# Patient Record
Sex: FEMALE | Employment: UNEMPLOYED | URBAN - METROPOLITAN AREA
[De-identification: names, ages, dates, MRNs, and addresses within clinical notes are randomized per-mention and may not be internally consistent; named-entity substitution may affect disease eponyms.]

---

## 2017-04-05 ENCOUNTER — DOCUMENTATION ONLY (OUTPATIENT)
Dept: PAIN MANAGEMENT | Age: 47
End: 2017-04-05

## 2017-04-05 NOTE — PROGRESS NOTES
Request for records from [address] [place] [address] and [facility] and fax request to [employer] to be process on 04/05/2017.

## 2018-06-07 ENCOUNTER — APPOINTMENT (OUTPATIENT)
Dept: GENERAL RADIOLOGY | Age: 48
DRG: 917 | End: 2018-06-07
Attending: EMERGENCY MEDICINE
Payer: SELF-PAY

## 2018-06-07 ENCOUNTER — APPOINTMENT (OUTPATIENT)
Dept: CT IMAGING | Age: 48
DRG: 917 | End: 2018-06-07
Attending: EMERGENCY MEDICINE
Payer: SELF-PAY

## 2018-06-07 ENCOUNTER — HOSPITAL ENCOUNTER (INPATIENT)
Age: 48
LOS: 5 days | Discharge: PSYCHIATRIC OR SUBSTANCE USE FACILITY | DRG: 917 | End: 2018-06-12
Attending: EMERGENCY MEDICINE | Admitting: INTERNAL MEDICINE
Payer: SELF-PAY

## 2018-06-07 DIAGNOSIS — R41.82 ALTERED MENTAL STATUS, UNSPECIFIED ALTERED MENTAL STATUS TYPE: ICD-10-CM

## 2018-06-07 DIAGNOSIS — T50.904A DRUG OVERDOSE, UNDETERMINED INTENT, INITIAL ENCOUNTER: Primary | ICD-10-CM

## 2018-06-07 PROBLEM — T50.901A OVERDOSE: Status: ACTIVE | Noted: 2018-06-07

## 2018-06-07 LAB
ALBUMIN SERPL-MCNC: 3.6 G/DL (ref 3.4–5)
ALBUMIN/GLOB SERPL: 0.9 {RATIO} (ref 0.8–1.7)
ALP SERPL-CCNC: 71 U/L (ref 45–117)
ALT SERPL-CCNC: 43 U/L (ref 13–56)
AMPHET UR QL SCN: NEGATIVE
ANION GAP SERPL CALC-SCNC: 12 MMOL/L (ref 3–18)
APAP SERPL-MCNC: <2 UG/ML (ref 10–30)
AST SERPL-CCNC: 121 U/L (ref 15–37)
BARBITURATES UR QL SCN: NEGATIVE
BASOPHILS # BLD: 0.1 K/UL (ref 0–0.06)
BASOPHILS NFR BLD: 0 % (ref 0–2)
BENZODIAZ UR QL: NEGATIVE
BILIRUB SERPL-MCNC: 0.3 MG/DL (ref 0.2–1)
BUN SERPL-MCNC: 14 MG/DL (ref 7–18)
BUN/CREAT SERPL: 13 (ref 12–20)
CALCIUM SERPL-MCNC: 8.7 MG/DL (ref 8.5–10.1)
CANNABINOIDS UR QL SCN: NEGATIVE
CHLORIDE SERPL-SCNC: 104 MMOL/L (ref 100–108)
CK SERPL-CCNC: 6408 U/L (ref 26–192)
CO2 SERPL-SCNC: 23 MMOL/L (ref 21–32)
COCAINE UR QL SCN: NEGATIVE
CREAT SERPL-MCNC: 1.07 MG/DL (ref 0.6–1.3)
DIFFERENTIAL METHOD BLD: ABNORMAL
EOSINOPHIL # BLD: 0 K/UL (ref 0–0.4)
EOSINOPHIL NFR BLD: 0 % (ref 0–5)
ERYTHROCYTE [DISTWIDTH] IN BLOOD BY AUTOMATED COUNT: 12.8 % (ref 11.6–14.5)
ETHANOL SERPL-MCNC: <3 MG/DL (ref 0–3)
GLOBULIN SER CALC-MCNC: 3.8 G/DL (ref 2–4)
GLUCOSE SERPL-MCNC: 138 MG/DL (ref 74–99)
HCT VFR BLD AUTO: 41.9 % (ref 35–45)
HDSCOM,HDSCOM: ABNORMAL
HGB BLD-MCNC: 13.9 G/DL (ref 12–16)
LACTATE SERPL-SCNC: 2 MMOL/L (ref 0.4–2)
LYMPHOCYTES # BLD: 2 K/UL (ref 0.9–3.6)
LYMPHOCYTES NFR BLD: 11 % (ref 21–52)
MAGNESIUM SERPL-MCNC: 2 MG/DL (ref 1.6–2.6)
MCH RBC QN AUTO: 29.8 PG (ref 24–34)
MCHC RBC AUTO-ENTMCNC: 33.2 G/DL (ref 31–37)
MCV RBC AUTO: 89.7 FL (ref 74–97)
METHADONE UR QL: POSITIVE
MONOCYTES # BLD: 1.3 K/UL (ref 0.05–1.2)
MONOCYTES NFR BLD: 7 % (ref 3–10)
NEUTS SEG # BLD: 15.1 K/UL (ref 1.8–8)
NEUTS SEG NFR BLD: 82 % (ref 40–73)
OPIATES UR QL: NEGATIVE
PCP UR QL: NEGATIVE
PLATELET # BLD AUTO: 234 K/UL (ref 135–420)
PMV BLD AUTO: 10.5 FL (ref 9.2–11.8)
POTASSIUM SERPL-SCNC: 5.3 MMOL/L (ref 3.5–5.5)
PROT SERPL-MCNC: 7.4 G/DL (ref 6.4–8.2)
RBC # BLD AUTO: 4.67 M/UL (ref 4.2–5.3)
SALICYLATES SERPL-MCNC: 4.8 MG/DL (ref 2.8–20)
SODIUM SERPL-SCNC: 139 MMOL/L (ref 136–145)
WBC # BLD AUTO: 18.5 K/UL (ref 4.6–13.2)

## 2018-06-07 PROCEDURE — 83605 ASSAY OF LACTIC ACID: CPT | Performed by: EMERGENCY MEDICINE

## 2018-06-07 PROCEDURE — 82550 ASSAY OF CK (CPK): CPT | Performed by: EMERGENCY MEDICINE

## 2018-06-07 PROCEDURE — 80053 COMPREHEN METABOLIC PANEL: CPT | Performed by: EMERGENCY MEDICINE

## 2018-06-07 PROCEDURE — 99285 EMERGENCY DEPT VISIT HI MDM: CPT

## 2018-06-07 PROCEDURE — 71045 X-RAY EXAM CHEST 1 VIEW: CPT

## 2018-06-07 PROCEDURE — 74011250636 HC RX REV CODE- 250/636: Performed by: EMERGENCY MEDICINE

## 2018-06-07 PROCEDURE — 83735 ASSAY OF MAGNESIUM: CPT | Performed by: INTERNAL MEDICINE

## 2018-06-07 PROCEDURE — 93005 ELECTROCARDIOGRAM TRACING: CPT

## 2018-06-07 PROCEDURE — 80307 DRUG TEST PRSMV CHEM ANLYZR: CPT | Performed by: EMERGENCY MEDICINE

## 2018-06-07 PROCEDURE — 96360 HYDRATION IV INFUSION INIT: CPT

## 2018-06-07 PROCEDURE — 70450 CT HEAD/BRAIN W/O DYE: CPT

## 2018-06-07 PROCEDURE — 87086 URINE CULTURE/COLONY COUNT: CPT | Performed by: INTERNAL MEDICINE

## 2018-06-07 PROCEDURE — 83735 ASSAY OF MAGNESIUM: CPT | Performed by: EMERGENCY MEDICINE

## 2018-06-07 PROCEDURE — 84100 ASSAY OF PHOSPHORUS: CPT | Performed by: INTERNAL MEDICINE

## 2018-06-07 PROCEDURE — 85025 COMPLETE CBC W/AUTO DIFF WBC: CPT | Performed by: EMERGENCY MEDICINE

## 2018-06-07 PROCEDURE — 81001 URINALYSIS AUTO W/SCOPE: CPT | Performed by: INTERNAL MEDICINE

## 2018-06-07 RX ORDER — ONDANSETRON 2 MG/ML
4 INJECTION INTRAMUSCULAR; INTRAVENOUS
Status: DISCONTINUED | OUTPATIENT
Start: 2018-06-07 | End: 2018-06-12 | Stop reason: HOSPADM

## 2018-06-07 RX ORDER — ENOXAPARIN SODIUM 100 MG/ML
40 INJECTION SUBCUTANEOUS EVERY 24 HOURS
Status: DISCONTINUED | OUTPATIENT
Start: 2018-06-07 | End: 2018-06-08

## 2018-06-07 RX ORDER — SODIUM CHLORIDE 9 MG/ML
200 INJECTION, SOLUTION INTRAVENOUS CONTINUOUS
Status: DISPENSED | OUTPATIENT
Start: 2018-06-07 | End: 2018-06-08

## 2018-06-07 RX ADMIN — SODIUM CHLORIDE 1000 ML: 900 INJECTION, SOLUTION INTRAVENOUS at 21:06

## 2018-06-08 ENCOUNTER — APPOINTMENT (OUTPATIENT)
Dept: ULTRASOUND IMAGING | Age: 48
DRG: 917 | End: 2018-06-08
Attending: INTERNAL MEDICINE
Payer: SELF-PAY

## 2018-06-08 LAB
ALBUMIN SERPL-MCNC: 2.4 G/DL (ref 3.4–5)
ALBUMIN SERPL-MCNC: 2.7 G/DL (ref 3.4–5)
ALBUMIN SERPL-MCNC: 3.2 G/DL (ref 3.4–5)
ALBUMIN/GLOB SERPL: 0.8 {RATIO} (ref 0.8–1.7)
ALBUMIN/GLOB SERPL: 1 {RATIO} (ref 0.8–1.7)
ALBUMIN/GLOB SERPL: 1.2 {RATIO} (ref 0.8–1.7)
ALP SERPL-CCNC: 52 U/L (ref 45–117)
ALP SERPL-CCNC: 55 U/L (ref 45–117)
ALP SERPL-CCNC: 64 U/L (ref 45–117)
ALT SERPL-CCNC: 107 U/L (ref 13–56)
ALT SERPL-CCNC: 128 U/L (ref 13–56)
ALT SERPL-CCNC: 131 U/L (ref 13–56)
AMMONIA PLAS-SCNC: 11 UMOL/L (ref 11–32)
ANION GAP SERPL CALC-SCNC: 11 MMOL/L (ref 3–18)
ANION GAP SERPL CALC-SCNC: 16 MMOL/L (ref 3–18)
APPEARANCE UR: CLEAR
APTT PPP: 113.2 SEC (ref 23–36.4)
APTT PPP: 174.6 SEC (ref 23–36.4)
APTT PPP: 34.9 SEC (ref 23–36.4)
ARTERIAL PATENCY WRIST A: YES
AST SERPL-CCNC: 456 U/L (ref 15–37)
AST SERPL-CCNC: 519 U/L (ref 15–37)
AST SERPL-CCNC: 535 U/L (ref 15–37)
BACTERIA URNS QL MICRO: ABNORMAL /HPF
BASE DEFICIT BLD-SCNC: 3 MMOL/L
BASOPHILS # BLD: 0.1 K/UL (ref 0–0.06)
BASOPHILS NFR BLD: 0 % (ref 0–2)
BDY SITE: ABNORMAL
BILIRUB DIRECT SERPL-MCNC: 0.1 MG/DL (ref 0–0.2)
BILIRUB DIRECT SERPL-MCNC: 0.1 MG/DL (ref 0–0.2)
BILIRUB SERPL-MCNC: 0.6 MG/DL (ref 0.2–1)
BILIRUB SERPL-MCNC: 0.6 MG/DL (ref 0.2–1)
BILIRUB SERPL-MCNC: 0.7 MG/DL (ref 0.2–1)
BILIRUB UR QL: NEGATIVE
BODY TEMPERATURE: 99
BUN SERPL-MCNC: 10 MG/DL (ref 7–18)
BUN SERPL-MCNC: 13 MG/DL (ref 7–18)
BUN/CREAT SERPL: 14 (ref 12–20)
BUN/CREAT SERPL: 15 (ref 12–20)
CA-I SERPL-SCNC: 1.08 MMOL/L (ref 1.12–1.32)
CALCIUM SERPL-MCNC: 7.4 MG/DL (ref 8.5–10.1)
CALCIUM SERPL-MCNC: 8.2 MG/DL (ref 8.5–10.1)
CHLORIDE SERPL-SCNC: 101 MMOL/L (ref 100–108)
CHLORIDE SERPL-SCNC: 107 MMOL/L (ref 100–108)
CK SERPL-CCNC: 6630 U/L (ref 26–192)
CK SERPL-CCNC: ABNORMAL U/L (ref 26–192)
CO2 SERPL-SCNC: 23 MMOL/L (ref 21–32)
CO2 SERPL-SCNC: 24 MMOL/L (ref 21–32)
COLOR UR: YELLOW
CREAT SERPL-MCNC: 0.72 MG/DL (ref 0.6–1.3)
CREAT SERPL-MCNC: 0.84 MG/DL (ref 0.6–1.3)
DIFFERENTIAL METHOD BLD: ABNORMAL
EOSINOPHIL # BLD: 0.1 K/UL (ref 0–0.4)
EOSINOPHIL NFR BLD: 0 % (ref 0–5)
EPITH CASTS URNS QL MICRO: ABNORMAL /LPF (ref 0–5)
ERYTHROCYTE [DISTWIDTH] IN BLOOD BY AUTOMATED COUNT: 12.8 % (ref 11.6–14.5)
GAS FLOW.O2 O2 DELIVERY SYS: ABNORMAL L/MIN
GAS FLOW.O2 SETTING OXYMISER: 2 L/M
GLOBULIN SER CALC-MCNC: 2.7 G/DL (ref 2–4)
GLOBULIN SER CALC-MCNC: 2.7 G/DL (ref 2–4)
GLOBULIN SER CALC-MCNC: 3.2 G/DL (ref 2–4)
GLUCOSE BLD STRIP.AUTO-MCNC: 125 MG/DL (ref 70–110)
GLUCOSE BLD STRIP.AUTO-MCNC: 129 MG/DL (ref 70–110)
GLUCOSE BLD STRIP.AUTO-MCNC: 153 MG/DL (ref 70–110)
GLUCOSE BLD STRIP.AUTO-MCNC: 156 MG/DL (ref 70–110)
GLUCOSE BLD STRIP.AUTO-MCNC: 157 MG/DL (ref 70–110)
GLUCOSE SERPL-MCNC: 114 MG/DL (ref 74–99)
GLUCOSE SERPL-MCNC: 147 MG/DL (ref 74–99)
GLUCOSE UR STRIP.AUTO-MCNC: NEGATIVE MG/DL
HCO3 BLD-SCNC: 23.2 MMOL/L (ref 22–26)
HCT VFR BLD AUTO: 41.4 % (ref 35–45)
HGB BLD-MCNC: 13.5 G/DL (ref 12–16)
HGB UR QL STRIP: ABNORMAL
HYALINE CASTS URNS QL MICRO: ABNORMAL /LPF (ref 0–2)
INR PPP: 1 (ref 0.8–1.2)
KETONES UR QL STRIP.AUTO: NEGATIVE MG/DL
LACTATE SERPL-SCNC: 1.1 MMOL/L (ref 0.4–2)
LACTATE SERPL-SCNC: 1.4 MMOL/L (ref 0.4–2)
LEUKOCYTE ESTERASE UR QL STRIP.AUTO: ABNORMAL
LYMPHOCYTES # BLD: 1.8 K/UL (ref 0.9–3.6)
LYMPHOCYTES NFR BLD: 14 % (ref 21–52)
MAGNESIUM SERPL-MCNC: 1.6 MG/DL (ref 1.6–2.6)
MAGNESIUM SERPL-MCNC: 1.9 MG/DL (ref 1.6–2.6)
MAGNESIUM SERPL-MCNC: 1.9 MG/DL (ref 1.6–2.6)
MCH RBC QN AUTO: 29.3 PG (ref 24–34)
MCHC RBC AUTO-ENTMCNC: 32.6 G/DL (ref 31–37)
MCV RBC AUTO: 90 FL (ref 74–97)
MONOCYTES # BLD: 1.1 K/UL (ref 0.05–1.2)
MONOCYTES NFR BLD: 9 % (ref 3–10)
MUCOUS THREADS URNS QL MICRO: ABNORMAL /LPF
NEUTS SEG # BLD: 10.1 K/UL (ref 1.8–8)
NEUTS SEG NFR BLD: 77 % (ref 40–73)
NITRITE UR QL STRIP.AUTO: NEGATIVE
PCO2 BLD: 44.5 MMHG (ref 35–45)
PH BLD: 7.33 [PH] (ref 7.35–7.45)
PH UR STRIP: 5 [PH] (ref 5–8)
PHOSPHATE SERPL-MCNC: 2.5 MG/DL (ref 2.5–4.9)
PHOSPHATE SERPL-MCNC: 6.9 MG/DL (ref 2.5–4.9)
PLATELET # BLD AUTO: 228 K/UL (ref 135–420)
PMV BLD AUTO: 10.4 FL (ref 9.2–11.8)
PO2 BLD: 91 MMHG (ref 80–100)
POTASSIUM SERPL-SCNC: 3.4 MMOL/L (ref 3.5–5.5)
POTASSIUM SERPL-SCNC: 4.4 MMOL/L (ref 3.5–5.5)
PROT SERPL-MCNC: 5.4 G/DL (ref 6.4–8.2)
PROT SERPL-MCNC: 5.6 G/DL (ref 6.4–8.2)
PROT SERPL-MCNC: 5.9 G/DL (ref 6.4–8.2)
PROT UR STRIP-MCNC: 100 MG/DL
PROTHROMBIN TIME: 12.3 SEC (ref 11.5–15.2)
RBC # BLD AUTO: 4.6 M/UL (ref 4.2–5.3)
RBC #/AREA URNS HPF: NEGATIVE /HPF (ref 0–5)
SAO2 % BLD: 96 % (ref 92–97)
SERVICE CMNT-IMP: ABNORMAL
SODIUM SERPL-SCNC: 140 MMOL/L (ref 136–145)
SODIUM SERPL-SCNC: 142 MMOL/L (ref 136–145)
SP GR UR REFRACTOMETRY: 1.02 (ref 1–1.03)
SPECIMEN TYPE: ABNORMAL
TOTAL RESP. RATE, ITRR: 17
TSH SERPL DL<=0.05 MIU/L-ACNC: 1.43 UIU/ML (ref 0.36–3.74)
UROBILINOGEN UR QL STRIP.AUTO: 1 EU/DL (ref 0.2–1)
WBC # BLD AUTO: 13.1 K/UL (ref 4.6–13.2)
WBC URNS QL MICRO: ABNORMAL /HPF (ref 0–5)

## 2018-06-08 PROCEDURE — 36415 COLL VENOUS BLD VENIPUNCTURE: CPT | Performed by: INTERNAL MEDICINE

## 2018-06-08 PROCEDURE — 83605 ASSAY OF LACTIC ACID: CPT | Performed by: INTERNAL MEDICINE

## 2018-06-08 PROCEDURE — 93925 LOWER EXTREMITY STUDY: CPT

## 2018-06-08 PROCEDURE — 82140 ASSAY OF AMMONIA: CPT | Performed by: INTERNAL MEDICINE

## 2018-06-08 PROCEDURE — 77010033678 HC OXYGEN DAILY

## 2018-06-08 PROCEDURE — 74011250636 HC RX REV CODE- 250/636: Performed by: INTERNAL MEDICINE

## 2018-06-08 PROCEDURE — 84100 ASSAY OF PHOSPHORUS: CPT | Performed by: INTERNAL MEDICINE

## 2018-06-08 PROCEDURE — 82962 GLUCOSE BLOOD TEST: CPT

## 2018-06-08 PROCEDURE — 82550 ASSAY OF CK (CPK): CPT | Performed by: INTERNAL MEDICINE

## 2018-06-08 PROCEDURE — 83735 ASSAY OF MAGNESIUM: CPT | Performed by: INTERNAL MEDICINE

## 2018-06-08 PROCEDURE — 85610 PROTHROMBIN TIME: CPT | Performed by: INTERNAL MEDICINE

## 2018-06-08 PROCEDURE — 74011250637 HC RX REV CODE- 250/637: Performed by: INTERNAL MEDICINE

## 2018-06-08 PROCEDURE — 74011000258 HC RX REV CODE- 258: Performed by: INTERNAL MEDICINE

## 2018-06-08 PROCEDURE — 85730 THROMBOPLASTIN TIME PARTIAL: CPT | Performed by: INTERNAL MEDICINE

## 2018-06-08 PROCEDURE — 76705 ECHO EXAM OF ABDOMEN: CPT

## 2018-06-08 PROCEDURE — 93005 ELECTROCARDIOGRAM TRACING: CPT

## 2018-06-08 PROCEDURE — 93970 EXTREMITY STUDY: CPT

## 2018-06-08 PROCEDURE — 65610000006 HC RM INTENSIVE CARE

## 2018-06-08 PROCEDURE — 80053 COMPREHEN METABOLIC PANEL: CPT | Performed by: INTERNAL MEDICINE

## 2018-06-08 PROCEDURE — 82803 BLOOD GASES ANY COMBINATION: CPT

## 2018-06-08 PROCEDURE — C9113 INJ PANTOPRAZOLE SODIUM, VIA: HCPCS | Performed by: INTERNAL MEDICINE

## 2018-06-08 PROCEDURE — 36600 WITHDRAWAL OF ARTERIAL BLOOD: CPT

## 2018-06-08 PROCEDURE — 84443 ASSAY THYROID STIM HORMONE: CPT | Performed by: INTERNAL MEDICINE

## 2018-06-08 PROCEDURE — 83874 ASSAY OF MYOGLOBIN: CPT | Performed by: INTERNAL MEDICINE

## 2018-06-08 PROCEDURE — 85025 COMPLETE CBC W/AUTO DIFF WBC: CPT | Performed by: INTERNAL MEDICINE

## 2018-06-08 PROCEDURE — 74011000250 HC RX REV CODE- 250: Performed by: INTERNAL MEDICINE

## 2018-06-08 PROCEDURE — 77030034850

## 2018-06-08 PROCEDURE — 80076 HEPATIC FUNCTION PANEL: CPT | Performed by: INTERNAL MEDICINE

## 2018-06-08 PROCEDURE — 82330 ASSAY OF CALCIUM: CPT | Performed by: INTERNAL MEDICINE

## 2018-06-08 PROCEDURE — 80048 BASIC METABOLIC PNL TOTAL CA: CPT | Performed by: INTERNAL MEDICINE

## 2018-06-08 PROCEDURE — 93923 UPR/LXTR ART STDY 3+ LVLS: CPT

## 2018-06-08 PROCEDURE — 87040 BLOOD CULTURE FOR BACTERIA: CPT | Performed by: INTERNAL MEDICINE

## 2018-06-08 RX ORDER — HEPARIN SODIUM 10000 [USP'U]/100ML
18-36 INJECTION, SOLUTION INTRAVENOUS
Status: DISCONTINUED | OUTPATIENT
Start: 2018-06-08 | End: 2018-06-11 | Stop reason: ALTCHOICE

## 2018-06-08 RX ORDER — OXYCODONE HYDROCHLORIDE 5 MG/1
5 TABLET ORAL
Status: DISCONTINUED | OUTPATIENT
Start: 2018-06-08 | End: 2018-06-12 | Stop reason: HOSPADM

## 2018-06-08 RX ORDER — VANCOMYCIN 1.75 GRAM/500 ML IN 0.9 % SODIUM CHLORIDE INTRAVENOUS
1750 ONCE
Status: COMPLETED | OUTPATIENT
Start: 2018-06-08 | End: 2018-06-08

## 2018-06-08 RX ORDER — VANCOMYCIN HYDROCHLORIDE
1250 EVERY 12 HOURS
Status: DISCONTINUED | OUTPATIENT
Start: 2018-06-08 | End: 2018-06-09

## 2018-06-08 RX ORDER — MAGNESIUM SULFATE HEPTAHYDRATE 40 MG/ML
2 INJECTION, SOLUTION INTRAVENOUS ONCE
Status: COMPLETED | OUTPATIENT
Start: 2018-06-08 | End: 2018-06-08

## 2018-06-08 RX ORDER — POTASSIUM CHLORIDE 7.45 MG/ML
10 INJECTION INTRAVENOUS
Status: COMPLETED | OUTPATIENT
Start: 2018-06-08 | End: 2018-06-09

## 2018-06-08 RX ORDER — NICOTINE 7MG/24HR
1 PATCH, TRANSDERMAL 24 HOURS TRANSDERMAL DAILY
Status: DISCONTINUED | OUTPATIENT
Start: 2018-06-09 | End: 2018-06-08

## 2018-06-08 RX ORDER — POTASSIUM CHLORIDE 7.45 MG/ML
10 INJECTION INTRAVENOUS
Status: DISPENSED | OUTPATIENT
Start: 2018-06-08 | End: 2018-06-08

## 2018-06-08 RX ORDER — NICOTINE 7MG/24HR
1 PATCH, TRANSDERMAL 24 HOURS TRANSDERMAL ONCE
Status: DISCONTINUED | OUTPATIENT
Start: 2018-06-08 | End: 2018-06-09

## 2018-06-08 RX ADMIN — POTASSIUM CHLORIDE 10 MEQ: 10 INJECTION, SOLUTION INTRAVENOUS at 23:30

## 2018-06-08 RX ADMIN — ACETYLCYSTEINE 13100 MG: 200 INJECTION, SOLUTION INTRAVENOUS at 06:40

## 2018-06-08 RX ADMIN — DOXYCYCLINE 100 MG: 100 INJECTION, POWDER, LYOPHILIZED, FOR SOLUTION INTRAVENOUS at 08:10

## 2018-06-08 RX ADMIN — POTASSIUM CHLORIDE 10 MEQ: 10 INJECTION, SOLUTION INTRAVENOUS at 14:19

## 2018-06-08 RX ADMIN — ACETYLCYSTEINE 4360 MG: 200 INJECTION, SOLUTION INTRAVENOUS at 08:23

## 2018-06-08 RX ADMIN — VANCOMYCIN HYDROCHLORIDE 1750 MG: 10 INJECTION, POWDER, LYOPHILIZED, FOR SOLUTION INTRAVENOUS at 09:46

## 2018-06-08 RX ADMIN — MAGNESIUM SULFATE HEPTAHYDRATE 2 G: 40 INJECTION, SOLUTION INTRAVENOUS at 12:49

## 2018-06-08 RX ADMIN — SODIUM CHLORIDE 200 ML/HR: 900 INJECTION, SOLUTION INTRAVENOUS at 21:12

## 2018-06-08 RX ADMIN — DOXYCYCLINE 100 MG: 100 INJECTION, POWDER, LYOPHILIZED, FOR SOLUTION INTRAVENOUS at 21:12

## 2018-06-08 RX ADMIN — ENOXAPARIN SODIUM 40 MG: 40 INJECTION SUBCUTANEOUS at 00:32

## 2018-06-08 RX ADMIN — ACETYLCYSTEINE 8740 MG: 200 INJECTION, SOLUTION INTRAVENOUS at 14:23

## 2018-06-08 RX ADMIN — PIPERACILLIN SODIUM,TAZOBACTAM SODIUM 3.38 G: 3; .375 INJECTION, POWDER, FOR SOLUTION INTRAVENOUS at 12:46

## 2018-06-08 RX ADMIN — OXYCODONE HYDROCHLORIDE 5 MG: 5 TABLET ORAL at 15:58

## 2018-06-08 RX ADMIN — POTASSIUM CHLORIDE 10 MEQ: 10 INJECTION, SOLUTION INTRAVENOUS at 17:38

## 2018-06-08 RX ADMIN — PANTOPRAZOLE SODIUM 40 MG: 40 INJECTION, POWDER, FOR SOLUTION INTRAVENOUS at 08:10

## 2018-06-08 RX ADMIN — HEPARIN SODIUM AND DEXTROSE 18 UNITS/KG/HR: 10000; 5 INJECTION INTRAVENOUS at 03:49

## 2018-06-08 RX ADMIN — PIPERACILLIN SODIUM,TAZOBACTAM SODIUM 3.38 G: 3; .375 INJECTION, POWDER, FOR SOLUTION INTRAVENOUS at 17:39

## 2018-06-08 RX ADMIN — CALCIUM GLUCONATE 2 G: 94 INJECTION, SOLUTION INTRAVENOUS at 22:34

## 2018-06-08 RX ADMIN — SODIUM CHLORIDE 150 ML/HR: 900 INJECTION, SOLUTION INTRAVENOUS at 00:32

## 2018-06-08 RX ADMIN — POTASSIUM CHLORIDE 10 MEQ: 10 INJECTION, SOLUTION INTRAVENOUS at 21:12

## 2018-06-08 RX ADMIN — PIPERACILLIN SODIUM,TAZOBACTAM SODIUM 3.38 G: 3; .375 INJECTION, POWDER, FOR SOLUTION INTRAVENOUS at 06:05

## 2018-06-08 RX ADMIN — POTASSIUM CHLORIDE 10 MEQ: 10 INJECTION, SOLUTION INTRAVENOUS at 15:59

## 2018-06-08 RX ADMIN — PIPERACILLIN SODIUM,TAZOBACTAM SODIUM 3.38 G: 3; .375 INJECTION, POWDER, FOR SOLUTION INTRAVENOUS at 00:33

## 2018-06-08 NOTE — ED NOTES
Pt awakens to voice. Pt able to move extremities on own. When asked what month it is, pt responds \"tuesday\". Pt denies current needs. VSS except for slight sinus tachycardia. Pt's RR even and unlabored.

## 2018-06-08 NOTE — PROGRESS NOTES
Pt seen and examined. Found down this AM at home 2/2 overdose. Found to have a DVT and a cold left foot on exam.  On exam this AM the patient is on a heparin drip with motor and sensory intact. Left foot cool compared to right. Compartments soft. Doppler biphasic PT signal.  Duplex reviewed with lower velocities on left than right but no obvious stenosis or occlusion with normal waveforms. Agree with heparin drip, serial exams and compartment checks. Will obtain CTA once acute rhabdo has resolved. Full consult to follow.

## 2018-06-08 NOTE — PROCEDURES
[facility]  *** FINAL REPORT ***    Name: Johanny Victor  MRN: [id number]    Inpatient  : 1970  HIS Order #: [id number]  [id number] [facility] Visit #: [id number]  Date: 2018    TYPE OF TEST: Peripheral Venous Testing    REASON FOR TEST  Limb swelling    Right Leg:-  Deep venous thrombosis:           Yes  Proximal extent of thrombus:      Peroneal  Superficial venous thrombosis:    Not examined  Deep venous insufficiency:        Not examined  Superficial venous insufficiency: Not examined    Left Leg:-  Deep venous thrombosis:           No  Superficial venous thrombosis:    Not examined  Deep venous insufficiency:        Not examined  Superficial venous insufficiency: Not examined      INTERPRETATION/FINDINGS  Duplex images were obtained using 2-D gray scale, color flow, and  spectral Doppler analysis. Right leg :  1. Deep vein(s) visualized include the common femoral, deep femoral,  proximal femoral, mid femoral, distal femoral, popliteal(above knee),  popliteal(fossa), popliteal(below knee), posterior tibial and peroneal   veins. 2. Acute nonocclusive deep venous thrombosis identified in the  peroneal vein. Left leg :  1. Deep vein(s) visualized include the common femoral, deep femoral,  proximal femoral, mid femoral, distal femoral, popliteal(above knee),  popliteal(fossa), popliteal(below knee), posterior tibial and peroneal   veins. 2. No evidence of deep venous thrombosis detected in the veins  visualized. ADDITIONAL COMMENTS  Verbal report given to nurse Leah Rolon at 2:44am.    I have personally reviewed the data relevant to the interpretation of  this  study. TECHNOLOGIST: Annamarie TRIVEDI, RVT  Signed: 2018 02:58 AM    PHYSICIAN: Calderon Escamilla MD  Signed: 2018 01:47 PM

## 2018-06-08 NOTE — PROGRESS NOTES
Patient seen by Dr. Talat Pryor in am  Case discussed in rounds  Monitor LFT and CK  IV fluids  Monitro mentation if worsening consider repeat ABG  Good UO  WIll get EEG  Will follow   Other quality metrics discussed      Horacio Cowan M.D  [address]  [place] [place] [address] [address]  Phone [phone]   Fax [phone]  Pulmonary Critical Care & Sleep Medicine
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
## 2018-06-08 NOTE — ED NOTES
Pt arrives via EMS for suspected overdose. Pt found by family on floor. Pt with black emesis around her mouth. Pt's RR even and unlabored. Pt sleepy but awakes to voice and stimuli. EMS found empty pills bottles of zyprexa. Pt able to somewhat state name. Unknown how many zyprexa were possibly taken.  Pt filled prescription on 5-08 for 60 pills to be taken twice daily
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
## 2018-06-08 NOTE — PROGRESS NOTES
Problem: Falls - Risk of  Goal: *Absence of Falls  Document Shnona Fall Risk and appropriate interventions in the flowsheet. Outcome: Progressing Towards Goal  Fall Risk Interventions:       Mentation Interventions: Adequate sleep, hydration, pain control, Door open when patient unattended, Evaluate medications/consider consulting pharmacy    Medication Interventions: Evaluate medications/consider consulting pharmacy, Patient to call before getting OOB, Teach patient to arise slowly    Elimination Interventions: Call light in reach, Toileting schedule/hourly rounds             Problem: Pressure Injury - Risk of  Goal: *Prevention of pressure injury  Document Byron Scale and appropriate interventions in the flowsheet.    Outcome: Progressing Towards Goal  Pressure Injury Interventions:  Sensory Interventions: Assess changes in LOC    Moisture Interventions: Absorbent underpads, Check for incontinence Q2 hours and as needed, Internal/External urinary devices    Activity Interventions: Assess need for specialty bed, PT/OT evaluation    Mobility Interventions: Assess need for specialty bed, PT/OT evaluation    Nutrition Interventions: Discuss nutritional consult with provider

## 2018-06-08 NOTE — PROGRESS NOTES
-0700 Bedside and Verbal shift change report given to Sterling Sellers RN (oncoming nurse) by Jenny Workman RN (offgoing nurse). Report included the following information SBAR, Kardex, MAR and Cardiac Rhythm Sinus Tach. 0800 - Initial assessment completed. Left leg is pale, pulse only heard by doppler. Doctor Mesha Guillermo at bedside, will place warming blanket to bring temperature up. 1030 - IDR complete, orders received. 1200 - Reassessment completed, changes noted as the following, left leg noted to be warm and pulses are now palpable. 1600- Reassessment completed, no changes to previous assessment. 1800 - Spoke with doctor Phu Owen, reported heart rate in the 130-140's, no new orders received, asked for nicotine patch, orders received. Verbal shift change report given to Cindy Kearns RN (oncoming nurse) by Renate Lee RN (offgoing nurse). Report included the following information SBAR, Kardex, MAR and Cardiac Rhythm Sinus tach.

## 2018-06-08 NOTE — ROUTINE PROCESS
TRANSFER - OUT REPORT:    Verbal report given to Sally Guerrero RN (name) on Al Doherty  being transferred to ICU room 6 (unit) for routine progression of care       Report consisted of patients Situation, Background, Assessment and   Recommendations(SBAR). Information from the following report(s) SBAR and ED Summary was reviewed with the receiving nurse. Lines:   Peripheral IV 06/07/18 Right Hand (Active)   Site Assessment Clean, dry, & intact 6/7/2018  9:03 PM   Dressing Status Clean, dry, & intact 6/7/2018  9:03 PM       Peripheral IV 06/07/18 Left Forearm (Active)   Site Assessment Clean, dry, & intact 6/7/2018  9:15 PM   Dressing Status Clean, dry, & intact 6/7/2018  9:15 PM        Opportunity for questions and clarification was provided.       Patient transported with:   Monitor  O2 @ 2 liters  Registered Nurse

## 2018-06-08 NOTE — PROGRESS NOTES
Pharmacy Dosing Services: Vancomycin    Consult for Vancomycin Dosing by Pharmacy by Dr. Susan Cruz provided for this 57y.o. year old female , for indication of Empiric, Asp PNA. Day of Therapy 01    Ht Readings from Last 1 Encounters:   05/04/12 152.4 cm (60\")        Wt Readings from Last 1 Encounters:   06/08/18 87.3 kg (192 lb 7.4 oz)        Previous Regimen NA   Last Level NA   Other Current Antibiotics Doxycycline 100 mg IV q12, Zosyn 3.375 gm IV q6   Significant Cultures Pending   Serum Creatinine Lab Results   Component Value Date/Time    Creatinine 0.84 06/08/2018 03:06 AM      Creatinine Clearance CrCl cannot be calculated (Unknown ideal weight.). BUN Lab Results   Component Value Date/Time    BUN 13 06/08/2018 03:06 AM      WBC Lab Results   Component Value Date/Time    WBC 13.1 06/08/2018 03:06 AM      H/H Lab Results   Component Value Date/Time    HGB 13.5 06/08/2018 03:06 AM      Platelets Lab Results   Component Value Date/Time    PLATELET 119 07/37/2160 03:06 AM      Temp 99.1 °F (37.3 °C)     Start Vancomycin therapy, with loading dose of 1750 mg IV x1 @ 09:00 [id number]  Follow with maintenance dose of Vancomycin 1250 mg IV q12, beginning @ 21:00 [id number] x7 days    Dose calculated to approximate a therapeutic trough of 15-20 mcg/mL. Pharmacy to follow daily and will make changes to dose and/or frequency based on clinical status. Alla Douglass, Pharm. D.   Clinical Pharmacist  [phone]

## 2018-06-08 NOTE — PROCEDURES
[facility]  *** FINAL REPORT ***    Name: Latonya Smith  MRN: [id number]    Inpatient  : 1970  HIS Order #: [id number]  [id number] [facility] Visit #: [id number]  Date: 2018    TYPE OF TEST: Peripheral Arterial Testing    REASON FOR TEST  Rest pain (both sides)    Right Leg  Doppler:    Normal  Ankle/Brachial:    Left Leg  Doppler:    Normal  Ankle/Brachial: 1.01    INTERPRETATION/FINDINGS  Physiologic testing was performed using continuous wave Doppler and  segmental pressures. 1. No evidence of significant peripheral arterial disease at rest in  the right leg. 2. No evidence of significant peripheral arterial disease at rest in  the left leg. 3. Multiphasic Doppler waveforms are present in the bilateral  posterior tibial and dorsalis pedis arteries. 4. The right ankle pressure is not obtained, due to the presence of  acute deep venous thrombosis in the right peroneal vein. 5. The left ankle/brachial index is 1.01.  6. The great toe pressures are 75 mmHg on the right and 76 mmHg on the   left. ADDITIONAL COMMENTS    I have personally reviewed the data relevant to the interpretation of  this  study. TECHNOLOGIST: Romeo Uribe  Signed: 2018 09:34 AM    PHYSICIAN: Jennifer Cárdenas.  Zara Doll MD  Signed: 2018 01:49 PM
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
## 2018-06-08 NOTE — ED TRIAGE NOTES
PT brought to ED by EMS c/c AMS r/t  possible overdose on unknown amount of 15mg Zyprexia. Per EMS patient found face down on floor by daughter, appears to have black tarry emesis in mouth and nose. Pt responds to verbal and painful stimuli, unable to recall how much medication taken. Prescription vial dated 5/8 contained 60 tabs, and ordered to take 2 bid.

## 2018-06-08 NOTE — PROCEDURES
[facility]  *** FINAL REPORT ***    Name: Deloris Mao  MRN: [id number]    Inpatient  : 1970  HIS Order #: [id number]  [id number] [facility] Visit #: [id number]  Date: 2018    TYPE OF TEST: Extremity Arterial Duplex    REASON FOR TEST  Pulseless                            Right                     Left  Artery               PSV   Finding             PSV   Finding  ------------------  -----  ---------------    -----  ---------------  External iliac:  Common femoral:     116.8                     116.8  Profunda femoris:    77.1                      95.4  Proximal SFA:       114.2                      95.4  Mid SFA:            111.6                      73.4  Distal SFA:          84.5                      69.5  Popliteal:           84.5                      66.2  Anterior tibial:     93.1                      32.9  Posterior tibial:   120.7                      37.2    Pressures               Right     Left               -----     -----     Brachial:           DP:           PT:            YANNICK:            Toe: INTERPRETATION/FINDINGS  Duplex images were obtained using 2-D gray scale, color flow, and  spectral Doppler analysis. No evidence of hemodynamically significant arterial stenosis in the  bilateral lower extermities. ADDITIONAL COMMENTS    I have personally reviewed the data relevant to the interpretation of  this  study. TECHNOLOGIST: Antonio TRIVEDI, RVT  Signed: 2018 03:04 AM    PHYSICIAN: Lexi Ospina.  Yamilka Bermeo MD  Signed: 2018 01:49 PM
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
## 2018-06-08 NOTE — ED NOTES
Patient gave this RN permission to speak with daughter, Lilli Orozco, about plan of care. Phone number [phone] to notify daughter of any changes.

## 2018-06-08 NOTE — PROGRESS NOTES
Reason for Admission:   Drug overdose                   RRAT Score:6                     Plan for utilizing home health:     TBD                     Likelihood of Readmission:  TBD                         Transition of Care Plan:  Chart reviewed,visited pt at bedside, at this time pt remains confused,sitter at bedside no family present,pt will not be discharged over weekend,cm will cont to review case and remain available.

## 2018-06-08 NOTE — PROGRESS NOTES
2341-Verbal shift change report given to Tremaine Ramírez RN (oncoming nurse) by Daniel Gustafson RN in ED (offgoing nurse). Report included the following information SBAR, Kardex, ED Summary, Intake/Output, MAR, Accordion and Alarm Parameters . 0010-Pt received via stretcher with ED, RN. X2 RN Skin assessment performed. No pressure injuries noted. Skin is dry and intact. CHG bath given and gown changed. Attempted to orient patient to room, but unable to follow directions as this time. Placed Purewick for strict I&O's. Oral care provided, black substance all over inside of mouth. Per report from ED, pt had charcoal black emesis in ED and MD is aware. 0030-While assessing pedal pulses, noted right foot is warm to touch and left foot is ice cold. Pulses difficult to palpate, thready with doppler. Immediately notified Dr. Rm Callaway. Orders received for bilateral Arterial and Venous dopplers stat. Paged vascular for STAT exam.   0100-No call back from Vascular lab, repaged.  placed direct call, Vascular tech made aware of STAT orders at this time. 0145-Vascular tech, Eva Hobbs in to perform venous and arterial dopplers of Lower extremities. 0240-Vascular tech gave preliminary critical result of +DVT in Rt Perineal (Non-occlusive) and negative for Arterial. Called Dr. Rm Callaway with results. New orders received for Heparin Gtt to be initiated. 0450-GUILLERMINA Cabrera from poison control called for updated status on pt. Updated on current sbar, labs and recommendations for treatment for patient received. Paged Dr. Rm Callaway to review current recommendations. 0530-No return call from MD at this time. Repaged. Neptali Gates on unit. Updated on recommendations from poison control and limited Urine output through shift (Pt had one small un measurable urine output). New orders received for painter and Acetadote *See mar* While speaking to MD, Pt attempted to get OOB. Bed alarm alarming, assisted backm -155 at this time. 0630-Corona placed with CHAD Black RN X2 RN's under aseptic technique. 1300ml brown foul-smelling urine produced. Sitter remains at bedside for safety. 0736-Bedside verbal report given to NEELAM Langley RN & Alexander Jesus RN. Sbar, mar, labs, kardex and patient status reviewed. Relinquished care of patient. 6764-GUILLERMINA Friedman from Poison control called and updated on pt status. Recommendations made per poison control. Dr. Alicia Drummond agreed to Recommendations. Repeat chemistry labs, Repeat EKG, place on Mg, K, Ph, & Ca protocols, and check Hepatic panels 2 Hrs BEFORE last Acedadote bag is completed.

## 2018-06-08 NOTE — PROGRESS NOTES
Spoke with Dr Jojo Hernandez, diet orders received, orders for roxycodone 5mg PO Q4H PRN for moderate to sever pain.

## 2018-06-09 ENCOUNTER — APPOINTMENT (OUTPATIENT)
Dept: GENERAL RADIOLOGY | Age: 48
DRG: 917 | End: 2018-06-09
Attending: INTERNAL MEDICINE
Payer: SELF-PAY

## 2018-06-09 PROBLEM — M62.82 RHABDOMYOLYSIS: Status: ACTIVE | Noted: 2018-06-09

## 2018-06-09 PROBLEM — I82.409 DVT (DEEP VENOUS THROMBOSIS) (HCC): Status: ACTIVE | Noted: 2018-06-09

## 2018-06-09 LAB
ALBUMIN SERPL-MCNC: 2 G/DL (ref 3.4–5)
ALBUMIN SERPL-MCNC: 2.2 G/DL (ref 3.4–5)
ALBUMIN/GLOB SERPL: 0.7 {RATIO} (ref 0.8–1.7)
ALBUMIN/GLOB SERPL: 0.8 {RATIO} (ref 0.8–1.7)
ALP SERPL-CCNC: 42 U/L (ref 45–117)
ALP SERPL-CCNC: 45 U/L (ref 45–117)
ALT SERPL-CCNC: 113 U/L (ref 13–56)
ALT SERPL-CCNC: 120 U/L (ref 13–56)
ANION GAP SERPL CALC-SCNC: 12 MMOL/L (ref 3–18)
APTT PPP: 92.8 SEC (ref 23–36.4)
ARTERIAL PATENCY WRIST A: YES
AST SERPL-CCNC: 426 U/L (ref 15–37)
AST SERPL-CCNC: 456 U/L (ref 15–37)
BACTERIA SPEC CULT: NORMAL
BASE DEFICIT BLD-SCNC: 3 MMOL/L
BASOPHILS # BLD: 0 K/UL (ref 0–0.06)
BASOPHILS NFR BLD: 0 % (ref 0–2)
BDY SITE: ABNORMAL
BILIRUB DIRECT SERPL-MCNC: 0.1 MG/DL (ref 0–0.2)
BILIRUB SERPL-MCNC: 0.3 MG/DL (ref 0.2–1)
BILIRUB SERPL-MCNC: 0.6 MG/DL (ref 0.2–1)
BODY TEMPERATURE: 98.1
BUN SERPL-MCNC: 4 MG/DL (ref 7–18)
BUN/CREAT SERPL: 9 (ref 12–20)
CA-I SERPL-SCNC: 1.11 MMOL/L (ref 1.12–1.32)
CALCIUM SERPL-MCNC: 7.6 MG/DL (ref 8.5–10.1)
CHLORIDE SERPL-SCNC: 104 MMOL/L (ref 100–108)
CK SERPL-CCNC: ABNORMAL U/L (ref 26–192)
CK SERPL-CCNC: ABNORMAL U/L (ref 26–192)
CO2 SERPL-SCNC: 21 MMOL/L (ref 21–32)
CREAT SERPL-MCNC: 0.47 MG/DL (ref 0.6–1.3)
DIFFERENTIAL METHOD BLD: ABNORMAL
EOSINOPHIL # BLD: 0.2 K/UL (ref 0–0.4)
EOSINOPHIL NFR BLD: 2 % (ref 0–5)
ERYTHROCYTE [DISTWIDTH] IN BLOOD BY AUTOMATED COUNT: 12.6 % (ref 11.6–14.5)
ERYTHROCYTE [DISTWIDTH] IN BLOOD BY AUTOMATED COUNT: 12.7 % (ref 11.6–14.5)
GAS FLOW.O2 O2 DELIVERY SYS: ABNORMAL L/MIN
GAS FLOW.O2 SETTING OXYMISER: 2 L/M
GLOBULIN SER CALC-MCNC: 2.9 G/DL (ref 2–4)
GLOBULIN SER CALC-MCNC: 2.9 G/DL (ref 2–4)
GLUCOSE BLD STRIP.AUTO-MCNC: 116 MG/DL (ref 70–110)
GLUCOSE BLD STRIP.AUTO-MCNC: 121 MG/DL (ref 70–110)
GLUCOSE BLD STRIP.AUTO-MCNC: 128 MG/DL (ref 70–110)
GLUCOSE BLD STRIP.AUTO-MCNC: 133 MG/DL (ref 70–110)
GLUCOSE BLD STRIP.AUTO-MCNC: 160 MG/DL (ref 70–110)
GLUCOSE SERPL-MCNC: 125 MG/DL (ref 74–99)
HCO3 BLD-SCNC: 22 MMOL/L (ref 22–26)
HCT VFR BLD AUTO: 33.2 % (ref 35–45)
HCT VFR BLD AUTO: 33.4 % (ref 35–45)
HGB BLD-MCNC: 10.7 G/DL (ref 12–16)
HGB BLD-MCNC: 11 G/DL (ref 12–16)
INR PPP: 1.3 (ref 0.8–1.2)
LYMPHOCYTES # BLD: 1.3 K/UL (ref 0.9–3.6)
LYMPHOCYTES NFR BLD: 11 % (ref 21–52)
MAGNESIUM SERPL-MCNC: 1.5 MG/DL (ref 1.6–2.6)
MAGNESIUM SERPL-MCNC: 1.7 MG/DL (ref 1.6–2.6)
MCH RBC QN AUTO: 28.5 PG (ref 24–34)
MCH RBC QN AUTO: 29.3 PG (ref 24–34)
MCHC RBC AUTO-ENTMCNC: 32 G/DL (ref 31–37)
MCHC RBC AUTO-ENTMCNC: 33.1 G/DL (ref 31–37)
MCV RBC AUTO: 88.5 FL (ref 74–97)
MCV RBC AUTO: 89.1 FL (ref 74–97)
MONOCYTES # BLD: 0.7 K/UL (ref 0.05–1.2)
MONOCYTES NFR BLD: 6 % (ref 3–10)
NEUTS SEG # BLD: 9.5 K/UL (ref 1.8–8)
NEUTS SEG NFR BLD: 81 % (ref 40–73)
PCO2 BLD: 34.2 MMHG (ref 35–45)
PH BLD: 7.41 [PH] (ref 7.35–7.45)
PHOSPHATE SERPL-MCNC: 2 MG/DL (ref 2.5–4.9)
PHOSPHATE SERPL-MCNC: 2.1 MG/DL (ref 2.5–4.9)
PLATELET # BLD AUTO: 113 K/UL (ref 135–420)
PLATELET # BLD AUTO: 146 K/UL (ref 135–420)
PMV BLD AUTO: 10.7 FL (ref 9.2–11.8)
PMV BLD AUTO: 11.1 FL (ref 9.2–11.8)
PO2 BLD: 88 MMHG (ref 80–100)
POTASSIUM SERPL-SCNC: 3.3 MMOL/L (ref 3.5–5.5)
POTASSIUM SERPL-SCNC: 3.4 MMOL/L (ref 3.5–5.5)
PROT SERPL-MCNC: 4.9 G/DL (ref 6.4–8.2)
PROT SERPL-MCNC: 5.1 G/DL (ref 6.4–8.2)
PROTHROMBIN TIME: 15.9 SEC (ref 11.5–15.2)
RBC # BLD AUTO: 3.75 M/UL (ref 4.2–5.3)
RBC # BLD AUTO: 3.75 M/UL (ref 4.2–5.3)
SAO2 % BLD: 97 % (ref 92–97)
SERVICE CMNT-IMP: ABNORMAL
SERVICE CMNT-IMP: NORMAL
SODIUM SERPL-SCNC: 137 MMOL/L (ref 136–145)
SPECIMEN TYPE: ABNORMAL
TOTAL RESP. RATE, ITRR: 24
WBC # BLD AUTO: 11.3 K/UL (ref 4.6–13.2)
WBC # BLD AUTO: 11.7 K/UL (ref 4.6–13.2)

## 2018-06-09 PROCEDURE — 74011000250 HC RX REV CODE- 250: Performed by: INTERNAL MEDICINE

## 2018-06-09 PROCEDURE — 74011000258 HC RX REV CODE- 258: Performed by: INTERNAL MEDICINE

## 2018-06-09 PROCEDURE — 85025 COMPLETE CBC W/AUTO DIFF WBC: CPT | Performed by: INTERNAL MEDICINE

## 2018-06-09 PROCEDURE — 86022 PLATELET ANTIBODIES: CPT | Performed by: INTERNAL MEDICINE

## 2018-06-09 PROCEDURE — 65610000006 HC RM INTENSIVE CARE

## 2018-06-09 PROCEDURE — 84132 ASSAY OF SERUM POTASSIUM: CPT | Performed by: INTERNAL MEDICINE

## 2018-06-09 PROCEDURE — 74011250637 HC RX REV CODE- 250/637: Performed by: INTERNAL MEDICINE

## 2018-06-09 PROCEDURE — C9113 INJ PANTOPRAZOLE SODIUM, VIA: HCPCS | Performed by: INTERNAL MEDICINE

## 2018-06-09 PROCEDURE — 82803 BLOOD GASES ANY COMBINATION: CPT

## 2018-06-09 PROCEDURE — 82330 ASSAY OF CALCIUM: CPT | Performed by: INTERNAL MEDICINE

## 2018-06-09 PROCEDURE — 85027 COMPLETE CBC AUTOMATED: CPT | Performed by: INTERNAL MEDICINE

## 2018-06-09 PROCEDURE — 74011250636 HC RX REV CODE- 250/636: Performed by: INTERNAL MEDICINE

## 2018-06-09 PROCEDURE — 85730 THROMBOPLASTIN TIME PARTIAL: CPT | Performed by: INTERNAL MEDICINE

## 2018-06-09 PROCEDURE — 85610 PROTHROMBIN TIME: CPT | Performed by: INTERNAL MEDICINE

## 2018-06-09 PROCEDURE — 77010033678 HC OXYGEN DAILY

## 2018-06-09 PROCEDURE — 83735 ASSAY OF MAGNESIUM: CPT | Performed by: INTERNAL MEDICINE

## 2018-06-09 PROCEDURE — 82550 ASSAY OF CK (CPK): CPT | Performed by: INTERNAL MEDICINE

## 2018-06-09 PROCEDURE — 82962 GLUCOSE BLOOD TEST: CPT

## 2018-06-09 PROCEDURE — 71045 X-RAY EXAM CHEST 1 VIEW: CPT

## 2018-06-09 PROCEDURE — 36600 WITHDRAWAL OF ARTERIAL BLOOD: CPT

## 2018-06-09 PROCEDURE — 36415 COLL VENOUS BLD VENIPUNCTURE: CPT | Performed by: INTERNAL MEDICINE

## 2018-06-09 PROCEDURE — 80076 HEPATIC FUNCTION PANEL: CPT | Performed by: INTERNAL MEDICINE

## 2018-06-09 PROCEDURE — 84100 ASSAY OF PHOSPHORUS: CPT | Performed by: INTERNAL MEDICINE

## 2018-06-09 PROCEDURE — 73502 X-RAY EXAM HIP UNI 2-3 VIEWS: CPT

## 2018-06-09 RX ORDER — MAGNESIUM SULFATE HEPTAHYDRATE 40 MG/ML
2 INJECTION, SOLUTION INTRAVENOUS ONCE
Status: COMPLETED | OUTPATIENT
Start: 2018-06-09 | End: 2018-06-09

## 2018-06-09 RX ORDER — IBUPROFEN 200 MG
1 TABLET ORAL DAILY
Status: DISCONTINUED | OUTPATIENT
Start: 2018-06-09 | End: 2018-06-12 | Stop reason: HOSPADM

## 2018-06-09 RX ORDER — POTASSIUM CHLORIDE 750 MG/1
10 TABLET, EXTENDED RELEASE ORAL ONCE
Status: COMPLETED | OUTPATIENT
Start: 2018-06-10 | End: 2018-06-10

## 2018-06-09 RX ORDER — IBUPROFEN 200 MG
1 TABLET ORAL DAILY
Status: DISCONTINUED | OUTPATIENT
Start: 2018-06-10 | End: 2018-06-09 | Stop reason: RX

## 2018-06-09 RX ORDER — POTASSIUM CHLORIDE 20 MEQ/1
40 TABLET, EXTENDED RELEASE ORAL ONCE
Status: COMPLETED | OUTPATIENT
Start: 2018-06-09 | End: 2018-06-09

## 2018-06-09 RX ORDER — SODIUM,POTASSIUM PHOSPHATES 280-250MG
2 POWDER IN PACKET (EA) ORAL
Status: COMPLETED | OUTPATIENT
Start: 2018-06-09 | End: 2018-06-09

## 2018-06-09 RX ORDER — SODIUM CHLORIDE 9 MG/ML
50 INJECTION, SOLUTION INTRAVENOUS CONTINUOUS
Status: DISCONTINUED | OUTPATIENT
Start: 2018-06-09 | End: 2018-06-12

## 2018-06-09 RX ORDER — SODIUM,POTASSIUM PHOSPHATES 280-250MG
2 POWDER IN PACKET (EA) ORAL
Status: COMPLETED | OUTPATIENT
Start: 2018-06-09 | End: 2018-06-10

## 2018-06-09 RX ORDER — POTASSIUM CHLORIDE 7.45 MG/ML
10 INJECTION INTRAVENOUS
Status: COMPLETED | OUTPATIENT
Start: 2018-06-09 | End: 2018-06-09

## 2018-06-09 RX ORDER — MAGNESIUM SULFATE 1 G/100ML
1 INJECTION INTRAVENOUS ONCE
Status: COMPLETED | OUTPATIENT
Start: 2018-06-09 | End: 2018-06-09

## 2018-06-09 RX ADMIN — POTASSIUM & SODIUM PHOSPHATES POWDER PACK 280-160-250 MG 2 PACKET: 280-160-250 PACK at 09:49

## 2018-06-09 RX ADMIN — POTASSIUM & SODIUM PHOSPHATES POWDER PACK 280-160-250 MG 2 PACKET: 280-160-250 PACK at 20:19

## 2018-06-09 RX ADMIN — POTASSIUM CHLORIDE 10 MEQ: 10 INJECTION, SOLUTION INTRAVENOUS at 12:45

## 2018-06-09 RX ADMIN — VANCOMYCIN HYDROCHLORIDE 1250 MG: 10 INJECTION, POWDER, LYOPHILIZED, FOR SOLUTION INTRAVENOUS at 01:35

## 2018-06-09 RX ADMIN — PIPERACILLIN SODIUM,TAZOBACTAM SODIUM 3.38 G: 3; .375 INJECTION, POWDER, FOR SOLUTION INTRAVENOUS at 01:34

## 2018-06-09 RX ADMIN — OXYCODONE HYDROCHLORIDE 5 MG: 5 TABLET ORAL at 06:16

## 2018-06-09 RX ADMIN — OXYCODONE HYDROCHLORIDE 5 MG: 5 TABLET ORAL at 10:21

## 2018-06-09 RX ADMIN — FAMOTIDINE 20 MG: 10 INJECTION, SOLUTION INTRAVENOUS at 12:45

## 2018-06-09 RX ADMIN — POTASSIUM & SODIUM PHOSPHATES POWDER PACK 280-160-250 MG 2 PACKET: 280-160-250 PACK at 06:12

## 2018-06-09 RX ADMIN — HEPARIN SODIUM AND DEXTROSE 13 UNITS/KG/HR: 10000; 5 INJECTION INTRAVENOUS at 01:36

## 2018-06-09 RX ADMIN — POTASSIUM CHLORIDE 40 MEQ: 20 TABLET, EXTENDED RELEASE ORAL at 20:20

## 2018-06-09 RX ADMIN — MAGNESIUM SULFATE HEPTAHYDRATE 1 G: 1 INJECTION, SOLUTION INTRAVENOUS at 20:22

## 2018-06-09 RX ADMIN — PIPERACILLIN SODIUM,TAZOBACTAM SODIUM 4.5 G: 4; .5 INJECTION, POWDER, FOR SOLUTION INTRAVENOUS at 19:25

## 2018-06-09 RX ADMIN — PIPERACILLIN SODIUM,TAZOBACTAM SODIUM 3.38 G: 3; .375 INJECTION, POWDER, FOR SOLUTION INTRAVENOUS at 08:25

## 2018-06-09 RX ADMIN — MAGNESIUM SULFATE HEPTAHYDRATE 2 G: 40 INJECTION, SOLUTION INTRAVENOUS at 06:12

## 2018-06-09 RX ADMIN — CALCIUM GLUCONATE 2 G: 94 INJECTION, SOLUTION INTRAVENOUS at 20:19

## 2018-06-09 RX ADMIN — POTASSIUM & SODIUM PHOSPHATES POWDER PACK 280-160-250 MG 2 PACKET: 280-160-250 PACK at 22:00

## 2018-06-09 RX ADMIN — POTASSIUM CHLORIDE 10 MEQ: 10 INJECTION, SOLUTION INTRAVENOUS at 09:50

## 2018-06-09 RX ADMIN — POTASSIUM CHLORIDE 10 MEQ: 10 INJECTION, SOLUTION INTRAVENOUS at 07:50

## 2018-06-09 RX ADMIN — POTASSIUM CHLORIDE 10 MEQ: 10 INJECTION, SOLUTION INTRAVENOUS at 10:20

## 2018-06-09 RX ADMIN — OXYCODONE HYDROCHLORIDE 5 MG: 5 TABLET ORAL at 19:25

## 2018-06-09 RX ADMIN — PIPERACILLIN SODIUM,TAZOBACTAM SODIUM 4.5 G: 4; .5 INJECTION, POWDER, FOR SOLUTION INTRAVENOUS at 14:45

## 2018-06-09 RX ADMIN — DOXYCYCLINE 100 MG: 100 INJECTION, POWDER, LYOPHILIZED, FOR SOLUTION INTRAVENOUS at 08:25

## 2018-06-09 RX ADMIN — POTASSIUM CHLORIDE 10 MEQ: 10 INJECTION, SOLUTION INTRAVENOUS at 08:20

## 2018-06-09 RX ADMIN — POTASSIUM & SODIUM PHOSPHATES POWDER PACK 280-160-250 MG 2 PACKET: 280-160-250 PACK at 08:25

## 2018-06-09 RX ADMIN — OXYCODONE HYDROCHLORIDE 5 MG: 5 TABLET ORAL at 14:55

## 2018-06-09 RX ADMIN — PANTOPRAZOLE SODIUM 40 MG: 40 INJECTION, POWDER, FOR SOLUTION INTRAVENOUS at 09:49

## 2018-06-09 RX ADMIN — FAMOTIDINE 20 MG: 10 INJECTION, SOLUTION INTRAVENOUS at 20:21

## 2018-06-09 NOTE — PROGRESS NOTES
Received bedside report from Sd Richardson RN. Pt Aox4, Heparin @13 units/kg/hr verified, NS @200ml/hr, Mg 2gm hanging at this time, LLE DP/PT pulses palpable, RLE DP/PT heard with Doppler, Corona Catheter draining/hanging below bladder. Pt sitting up in bed. Pt denies wanting to harm self/others, pt stated \"I don't know what happened but no I do not want to harm myself\". Pt in bed/bed in low position, wheels locked, call bell within reach. 4 eye skin assessment completed. 0750-Poison Control update on pt's Vitals, labs and Orientation. 1230-Per Dr. Carlos Guzmán inform Hospitalist: ok for pt to transfer will require 1:1 observation (sitter) and a psych consult. Dr. Nai Hatfield paged and informed. 1240-Pt request x-ray of Lt. Hip due to pain and fall PTA. Dr. Cralos Guzmán informed of pt's request, per Dr. Carlos Guzmán I will place an order for x-ray of hip.    1900-Bedside and Verbal shift change report given to Sd Richardson RN. (oncoming nurse) by [place] RN (offgoing nurse).  Report included the following information SBAR, Kardex, MAR and Cardiac Rhythm ST.

## 2018-06-09 NOTE — PROGRESS NOTES
DELPHINE Alvarez [address] [place] [address]  Phone [phone]   Fax [phone]    Pulmonary Critical Care & Sleep Medicine         Name: Zara Horne MRN: [id number]   : 1970 Hospital: [facility] [place]   Date: 2018  Room: 106/     IMPRESSION:   · Drug overdose ?  Zyperxa patient is not clear on it  · Acute encephalopathy - metabolic-- Improved  · Acute hepatitis-- Improving  · Rhabdomyolysis-- Improving  · Right leg distal vein DVT - Peroneal vein  · LT foot ischemia - PAD  · UTI   · Depression  · Bipolar disorder  · Coffee ground emesis  · Aspiration pneumonia  · Drop in PLT      RECOMMENDATIONS:  -- Recheck CBC to evaluate for PLT  -- Patient is on heparin drip will get Hematology involved as will be difficult decision if PLT continue to drop  -- Decrease iv fluids  -- Electrolyte like MG and K replaced  -- Requesting pain meds   -- Cr and other parameters are improved  -- CXR ok no sign of sepsis will stop vanco and doxy-- continue zosyn for now  -- Will need psych eval due to possible sucide  -- Overall doing well ok to transfer out of ICU- will let hospitalist know as it might involve psych eval and 1:1   · Pulm: stable respiration; on nc O2; watch for aspirations, Increase nicotine patch  · Cardiac: sinus tach; stable BP  · ID: zosyn,  follow cxs  · Renal: watch IOs, Ck is coming down  · GI: follow LFTs; NAC infusion; consulted Dr Airam Ko - hepatology  · Neuro: confused; CT head nil acute  · Hem: stable Hb; on heparin drip (distal vein DVT does not need anticoagulation, but will continue for LT foot ischemia); Dr Silverio Galo surgery;suggested to continue heparin, PLT is dropping will get hematology involved  · Endo: poc glucose  · Labs:TSH and Ammonia is ok  · MS: follow CKs; urine myoglobin  · Fluids:   /hr  · Diet as tolerated  · Proph:  DVt and GI proph reviewed  · Update family when available  Will defer respective systems problem management to primary and other consultant and follow patient in ICU with primary and other medical team  Further recommendations will be based on the patient's response to recommended treatment and results of the investigation ordered. Quality Care: PPI, DVT prophylaxis, HOB elevated, Infection control all reviewed and addressed. PAIN AND SEDATION: none   · Skin/Wound: no active issues  · Nutrition: NPO for now  · Prophylaxis: DVT and GI Prophylaxis reviewed  · Restraints: none  · PT/OT eval and treat: as needed  · Lines/Tubes: PIVs; painter cath medically necessary  ADVANCE DIRECTIVE: Full Code  · Ccm time 35 min      Subjective:  6/9/18  Doing well  More awake complaining of hip pain and asking for pain meds  CK improved to 19k  Cr is normal  Plt dropped to 113  CXR is ok     This patient has been seen and evaluated at the request of Dr. Derrell Adams for patient with drug overdose. Patient is a 57 y.o. female with hx of chronic pain, drug abuse, depression, anxiety, bipolar disorder, tachycardia. She was sent to ER with confusion and altered mentation by EMS. She was found with empty bottles of Olanzapine 15 mg BID (filled on 5/18/18, quantity of 60) and another Olanzapine 15 mg QD (filled 2/23/18, quantity of 60). She also takes ativan. She was found by daughter laying on the floor with black emesis. Patient has abnormal LFTs and elevated CK. She has painter cath and making urine.     LT foot cold, RT foot warm  US venous RT peroneal vein DVT    NS has been increased 200/hr this am    On heparin drip     Current Facility-Administered Medications   Medication Dose Route Frequency    0.9% sodium chloride infusion  75 mL/hr IntraVENous CONTINUOUS    piperacillin-tazobactam (ZOSYN) 4.5 g in 0.9% sodium chloride (MBP/ADV) 100 mL MBP  4.5 g IntraVENous Q6H    [START ON 6/10/2018] nicotine (NICODERM CQ) 14 mg/24 hr patch 1 Patch  1 Patch TransDERmal DAILY    heparin 25,000 units in D5W 250 ml infusion  18-36 Units/kg/hr IntraVENous TITRATE    doxycycline (VIBRAMYCIN) 100 mg in 0.9% sodium chloride (MBP/ADV) 100 mL MBP  100 mg IntraVENous Q12H       Latest lactic acid:   Lactic acid   Date Value Ref Range Status   2018 1.4 0.4 - 2.0 MMOL/L Final   2018 1.1 0.4 - 2.0 MMOL/L Final   2018 2.0 0.4 - 2.0 MMOL/L Final       Review of Systems:  Limited due to patient condition     Objective:   Vital Signs:    Visit Vitals    /72    Pulse (!) 112    Temp 97.5 °F (36.4 °C)    Resp 25    Ht 5' 1\" (1.549 m)    Wt 90.6 kg (199 lb 11.8 oz)    SpO2 96%    BMI 37.74 kg/m2       O2 Device: Room air   O2 Flow Rate (L/min): 2 l/min   Temp (24hrs), Av.2 °F (36.8 °C), Min:97.5 °F (36.4 °C), Max:99.7 °F (37.6 °C)       Intake/Output:   Last shift:       07 -  1900  In: 720 [P.O.:720]  Out: 1330 [Urine:1330]  Last 3 shifts:  190 -  0700  In: 5184.4 [P.O.:480; I.V.:4704.4]  Out: 7025 [Urine:7025]    Intake/Output Summary (Last 24 hours) at 18 1223  Last data filed at 18 1207   Gross per 24 hour   Intake          5904.35 ml   Output             7005 ml   Net         -1100.65 ml          Physical Exam:   Confused, on 2 L nc; acyanotic; looks older than stated age  [de-identified]: pupils not dilated, reactive, no scleral jaundice, no nasal drainage; neck supple  Neck: No adenopathy or thyroid swelling  CVS: S1S2 no murmurs; JVD not elevated  RS: Mod air entry bilaterally, decreased BS at bases, no wheezes, LT basal crackles  Abd: soft, non tender, no hepatosplenomegaly, no abd distension, no guarding or rigidity, bowel sounds heard  Neuro: awake, confused, moving toes bilateral; bilateral symmetrical poor strength in LEs  Extrm: Bilateral leg edema; LT foot cold and RT foot warm  Skin: no rash  Lymphatic: no cervical or supraclavicular adenopathy    Telemetry: sinus tach    Lines/Tubes:  PIVs  Corona cath    Data review:           CBC w/Diff Recent Labs      06/09/18   0340  06/08/18   0306 06/07/18 2055   WBC  11.7  13.1  18.5*   RBC  3.75*  4.60  4.67   HGB  10.7*  13.5  13.9   HCT  33.4*  41.4  41.9   PLT  113*  228  234   GRANS  81*  77*  82*   LYMPH  11*  14*  11*   EOS  2  0  0        Chemistry Recent Labs      06/09/18   0340  06/09/18   0016  06/08/18   1548  06/08/18   0913  06/08/18   0306 06/07/18 2055   GLU  125*   --    --   147*  114*  138*   NA  137   --    --   140  142  139   K  3.3*   --    --   3.4*  4.4  5.3   CL  104   --    --   101  107  104   CO2  21   --    --   23  24  23   BUN  4*   --    --   10  13  14   CREA  0.47*   --    --   0.72  0.84  1.07   CA  7.6*   --    --   7.4*  8.2*  8.7   MG  1.5*   --   1.9  1.6   --   1.9  2.0   PHOS  2.1*   --    --   2.5   --   6.9*   AGAP  12   --    --   16  11  12   BUCR  9*   --    --   14  15  13   AP  42*  45  52  55  64  71   TP  4.9*  5.1*  5.6*  5.4*  5.9*  7.4   ALB  2.0*  2.2*  2.4*  2.7*  3.2*  3.6   GLOB  2.9  2.9  3.2  2.7  2.7  3.8   AGRAT  0.7*  0.8  0.8  1.0  1.2  0.9        Lactic Acid Lactic acid   Date Value Ref Range Status   06/08/2018 1.4 0.4 - 2.0 MMOL/L Final     Recent Labs      06/08/18   0913  06/08/18   0306 06/07/18   2115   LAC  1.4  1.1  2.0        Micro  Recent Labs      06/08/18   1600  06/08/18   1548  06/07/18   2205   CULT  NO GROWTH AFTER 14 HOURS  NO GROWTH AFTER 14 HOURS  NO GROWTH 1 DAY     Recent Labs      06/08/18   1600  06/08/18   1548  06/07/18   2205   CULT  NO GROWTH AFTER 14 HOURS  NO GROWTH AFTER 14 HOURS  NO GROWTH 1 DAY        ABG Recent Labs      06/09/18   0508  06/08/18   0554   PHI  7.414  7.327*   PCO2I  34.2*  44.5   PO2I  88  91   HCO3I  22.0  23.2        Liver Enzymes Protein, total   Date Value Ref Range Status   06/09/2018 4.9 (L) 6.4 - 8.2 g/dL Final     Albumin   Date Value Ref Range Status   06/09/2018 2.0 (L) 3.4 - 5.0 g/dL Final     Globulin   Date Value Ref Range Status   06/09/2018 2.9 2.0 - 4.0 g/dL Final     A-G Ratio   Date Value Ref Range Status   06/09/2018 0.7 (L) 0.8 - 1.7   Final     AST (SGOT)   Date Value Ref Range Status   06/09/2018 426 (H) 15 - 37 U/L Final     Alk. phosphatase   Date Value Ref Range Status   06/09/2018 42 (L) 45 - 117 U/L Final     Recent Labs      06/09/18   0340  06/09/18   0016  06/08/18   1548   TP  4.9*  5.1*  5.6*   ALB  2.0*  2.2*  2.4*   GLOB  2.9  2.9  3.2   AGRAT  0.7*  0.8  0.8   SGOT  426*  456*  535*   AP  42*  45  52        Cardiac Enzymes Lab Results   Component Value Date/Time    CPK 32529 (H) 06/09/2018 03:40 AM    CPK 71003 (H) 06/09/2018 12:16 AM    CPK 97242 (H) 06/08/2018 03:48 PM        BNP No results found for: BNP, BNPP, XBNPT     Coagulation Recent Labs      06/09/18   0016  06/08/18   1548  06/08/18   0913  06/08/18   0306   PTP  15.9*   --    --   12.3   INR  1.3*   --    --   1.0   APTT  92.8*  174.6*  113.2*  34.9         Thyroid  Lab Results   Component Value Date/Time    TSH 1.43 06/08/2018 09:13 AM          Lipid Panel No results found for: CHOL, CHOLPOCT, CHOLX, CHLST, CHOLV, 932160, HDL, LDL, LDLC, DLDLP, 804825, VLDLC, VLDL, TGLX, TRIGL, TRIGP, TGLPOCT, CHHD, CHHDX       Urinalysis Lab Results   Component Value Date/Time    Color YELLOW 06/07/2018 10:05 PM    Appearance CLEAR 06/07/2018 10:05 PM    Specific gravity 1.021 06/07/2018 10:05 PM    pH (UA) 5.0 06/07/2018 10:05 PM    Protein 100 (A) 06/07/2018 10:05 PM    Glucose NEGATIVE  06/07/2018 10:05 PM    Ketone NEGATIVE  06/07/2018 10:05 PM    Bilirubin NEGATIVE  06/07/2018 10:05 PM    Urobilinogen 1.0 06/07/2018 10:05 PM    Nitrites NEGATIVE  06/07/2018 10:05 PM    Leukocyte Esterase TRACE (A) 06/07/2018 10:05 PM    Epithelial cells 1+ 06/07/2018 10:05 PM    Bacteria 1+ (A) 06/07/2018 10:05 PM    WBC 0 to 2 06/07/2018 10:05 PM    RBC NEGATIVE  06/07/2018 10:05 PM        XR (Most Recent).  CXR reviewed by me and compared with previous CXR   Results from Hospital Encounter encounter on 06/07/18   XR CHEST PORT   Narrative Chest, single view    Indication: Hypoxia    Comparison: June 7, 2018    Findings:  Portable upright AP view of the chest was obtained. Minimal linear  opacities are present at each lung base, with improved aeration from prior days  exam. No pneumothorax or pleural effusion. Cardiac size and mediastinal contours  are stable. No acute osseous abnormality. Impression Impression:  Improved pulmonary expansion, with mild bibasilar atelectasis. CT (Most Recent)   Results from Hospital Encounter encounter on 06/07/18   CT HEAD WO CONT   Narrative EXAM: CT head    INDICATION: Altered mental status. Diminished consciousness. COMPARISON: None. TECHNIQUE: Axial CT imaging of the head was performed without intravenous  contrast. Dose reduction techniques used: automated exposure control, adjustment  of the mAs and/or kVp according to patient size, and iterative reconstruction  techniques. _______________    FINDINGS:    BRAIN AND POSTERIOR FOSSA: The sulci, folia, ventricles and basal cisterns are  within normal limits for the patient's age. There is no intracranial hemorrhage,  mass effect, or midline shift. There are no areas of abnormal parenchymal  attenuation. EXTRA-AXIAL SPACES AND MENINGES: There are no abnormal extra-axial fluid  collections. CALVARIUM: Intact. SINUSES: Clear. OTHER: None.    _______________         Impression IMPRESSION:    No acute intracranial abnormalities. EKG No results found for this or any previous visit. ECHO No results found for this or any previous visit.        Recent Labs      06/09/18   0508  06/08/18   0554   HCO3I  22.0  23.2   PCO2I  34.2*  44.5   PHI  7.414  7.327*   PO2I  88  91       All Micro Results     Procedure Component Value Units Date/Time    CULTURE, URINE [[id number]] Collected:  06/07/18 2209    Order Status:  Completed Specimen:  Urine from Clean catch Updated:  06/09/18 0943     Special Requests: NO SPECIAL REQUESTS        Culture result: NO GROWTH 1 DAY       CULTURE, BLOOD [[id number]] Collected:  06/08/18 1548    Order Status:  Completed Specimen:  Whole Blood from Blood Updated:  06/09/18 0651     Special Requests: NO SPECIAL REQUESTS        Culture result: NO GROWTH AFTER 14 HOURS       CULTURE, BLOOD [[id number]] Collected:  06/08/18 1600    Order Status:  Completed Specimen:  Whole Blood from Blood Updated:  06/09/18 0651     Special Requests: NO SPECIAL REQUESTS        Culture result: NO GROWTH AFTER 14 HOURS       CULTURE, RESPIRATORY/SPUTUM/BRONCH Diomedes Ibanez [[id number]]     Order Status:  Sent Specimen:  Sputum from Sputum     CULTURE, BLOOD [[id number]]     Order Status:  Sent Specimen:  Blood from Blood         Imaging:  [x]I have personally reviewed the patients chest radiographs images and report       Results from Hospital Encounter encounter on 06/07/18   XR CHEST PORT   Narrative Chest, single view    Indication: Hypoxia    Comparison: June 7, 2018    Findings:  Portable upright AP view of the chest was obtained. Minimal linear  opacities are present at each lung base, with improved aeration from prior days  exam. No pneumothorax or pleural effusion. Cardiac size and mediastinal contours  are stable. No acute osseous abnormality. Impression Impression:  Improved pulmonary expansion, with mild bibasilar atelectasis. Results from [place] encounter on 06/07/18   CT HEAD WO CONT   Narrative EXAM: CT head    INDICATION: Altered mental status. Diminished consciousness. COMPARISON: None. TECHNIQUE: Axial CT imaging of the head was performed without intravenous  contrast. Dose reduction techniques used: automated exposure control, adjustment  of the mAs and/or kVp according to patient size, and iterative reconstruction  techniques.     _______________    FINDINGS:    BRAIN AND POSTERIOR FOSSA: The sulci, folia, ventricles and basal cisterns are  within normal limits for the patient's age. There is no intracranial hemorrhage,  mass effect, or midline shift. There are no areas of abnormal parenchymal  attenuation. EXTRA-AXIAL SPACES AND MENINGES: There are no abnormal extra-axial fluid  collections. CALVARIUM: Intact. SINUSES: Clear. OTHER: None.    _______________         Impression IMPRESSION:    No acute intracranial abnormalities.                Madhuri Graves MD
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
## 2018-06-09 NOTE — PROGRESS NOTES
Summary: Pt became less drowsy through shift. A&OX4 with periodic confusion. Sitter remains at bedside for suicide watch risk. Bed alarm on for prevention of falls. Tachycardic. Urine output adequate. Initially sienna at start of shift, now becoming clear yellow. BLE edema noted, pulses found with doppler. Hep drip infusing, no rate titration. Therapeutic level. Acetadote infusion completed @630 AM. NS continues to infuse at 200cc/hr. LFTs trending down. CK trending down. Pt complains of generalized aches, medicated with prn oxycodone. See administration on MAR. VSS. Electrolytes replaced per protocol. No sputum sample obtained, patient is not coughing up sputum. Bedside and Verbal shift change report given to [facility], [facility] (oncoming nurse) by Saintclair Khat, RN   (offgoing nurse). Report included the following information SBAR, Kardex and MAR.

## 2018-06-09 NOTE — PROGRESS NOTES
Pharmacy Dosing Services: Renal Dosing    Physician Dr. Rm Callaway    The following medication: Zosyn was automatically dose-adjusted per [facility] P[facility]T [facility] Protocol, with respect to renal function. Consult provided for this   57 y.o. , female , for the indication of Aspiration Pneumonia. Pt Weight:   Wt Readings from Last 1 Encounters:   06/09/18 90.6 kg (199 lb 11.8 oz)         Previous Regimen Zosyn 3.375 g IV q6h   Serum Creatinine Lab Results   Component Value Date/Time    Creatinine 0.47 (L) 06/09/2018 03:40 AM       Creatinine Clearance Estimated Creatinine Clearance: 153.2 mL/min (based on Cr of 0.47). BUN Lab Results   Component Value Date/Time    BUN 4 (L) 06/09/2018 03:40 AM         Dosage changed to:  Zosyn 4.5 g IV q6h starting 6/9/18 @ 1400      Pharmacy to continue to monitor patient daily. Will make dosage adjustments based upon changing renal function.   Signed TARSHA LockwoodD. Contact information: [phone]

## 2018-06-09 NOTE — PROGRESS NOTES
Problem: Falls - Risk of  Goal: *Absence of Falls  Document Shonna Fall Risk and appropriate interventions in the flowsheet.    Outcome: Progressing Towards Goal  Fall Risk Interventions:       Mentation Interventions: Adequate sleep, hydration, pain control    Medication Interventions: Evaluate medications/consider consulting pharmacy    Elimination Interventions: Call light in reach

## 2018-06-10 LAB
ALBUMIN SERPL-MCNC: 2 G/DL (ref 3.4–5)
ALBUMIN/GLOB SERPL: 0.6 {RATIO} (ref 0.8–1.7)
ALP SERPL-CCNC: 54 U/L (ref 45–117)
ALT SERPL-CCNC: 105 U/L (ref 13–56)
ANION GAP SERPL CALC-SCNC: 8 MMOL/L (ref 3–18)
APTT PPP: 55.7 SEC (ref 23–36.4)
APTT PPP: 62.2 SEC (ref 23–36.4)
APTT PPP: 81.7 SEC (ref 23–36.4)
AST SERPL-CCNC: 282 U/L (ref 15–37)
ATRIAL RATE: 124 BPM
ATRIAL RATE: 132 BPM
BASOPHILS # BLD: 0 K/UL (ref 0–0.06)
BASOPHILS NFR BLD: 0 % (ref 0–2)
BILIRUB SERPL-MCNC: 0.5 MG/DL (ref 0.2–1)
BUN SERPL-MCNC: 3 MG/DL (ref 7–18)
BUN/CREAT SERPL: 6 (ref 12–20)
CALCIUM SERPL-MCNC: 7.7 MG/DL (ref 8.5–10.1)
CALCULATED P AXIS, ECG09: 38 DEGREES
CALCULATED R AXIS, ECG10: 21 DEGREES
CALCULATED R AXIS, ECG10: 28 DEGREES
CALCULATED T AXIS, ECG11: 0 DEGREES
CALCULATED T AXIS, ECG11: 34 DEGREES
CHLORIDE SERPL-SCNC: 107 MMOL/L (ref 100–108)
CK SERPL-CCNC: ABNORMAL U/L (ref 26–192)
CO2 SERPL-SCNC: 25 MMOL/L (ref 21–32)
CREAT SERPL-MCNC: 0.53 MG/DL (ref 0.6–1.3)
DIAGNOSIS, 93000: NORMAL
DIAGNOSIS, 93000: NORMAL
DIFFERENTIAL METHOD BLD: ABNORMAL
EOSINOPHIL # BLD: 0.6 K/UL (ref 0–0.4)
EOSINOPHIL NFR BLD: 6 % (ref 0–5)
ERYTHROCYTE [DISTWIDTH] IN BLOOD BY AUTOMATED COUNT: 12.8 % (ref 11.6–14.5)
GLOBULIN SER CALC-MCNC: 3.2 G/DL (ref 2–4)
GLUCOSE BLD STRIP.AUTO-MCNC: 105 MG/DL (ref 70–110)
GLUCOSE BLD STRIP.AUTO-MCNC: 110 MG/DL (ref 70–110)
GLUCOSE BLD STRIP.AUTO-MCNC: 145 MG/DL (ref 70–110)
GLUCOSE BLD STRIP.AUTO-MCNC: 97 MG/DL (ref 70–110)
GLUCOSE BLD STRIP.AUTO-MCNC: 98 MG/DL (ref 70–110)
GLUCOSE SERPL-MCNC: 95 MG/DL (ref 74–99)
HCT VFR BLD AUTO: 31 % (ref 35–45)
HGB BLD-MCNC: 10.3 G/DL (ref 12–16)
LYMPHOCYTES # BLD: 1.8 K/UL (ref 0.9–3.6)
LYMPHOCYTES NFR BLD: 19 % (ref 21–52)
MAGNESIUM SERPL-MCNC: 1.8 MG/DL (ref 1.6–2.6)
MCH RBC QN AUTO: 29.4 PG (ref 24–34)
MCHC RBC AUTO-ENTMCNC: 33.2 G/DL (ref 31–37)
MCV RBC AUTO: 88.6 FL (ref 74–97)
MONOCYTES # BLD: 0.8 K/UL (ref 0.05–1.2)
MONOCYTES NFR BLD: 8 % (ref 3–10)
NEUTS SEG # BLD: 6.5 K/UL (ref 1.8–8)
NEUTS SEG NFR BLD: 67 % (ref 40–73)
P-R INTERVAL, ECG05: 104 MS
P-R INTERVAL, ECG05: 134 MS
PHOSPHATE SERPL-MCNC: 2.8 MG/DL (ref 2.5–4.9)
PLATELET # BLD AUTO: 176 K/UL (ref 135–420)
PMV BLD AUTO: 10.6 FL (ref 9.2–11.8)
POTASSIUM SERPL-SCNC: 3.8 MMOL/L (ref 3.5–5.5)
PROT SERPL-MCNC: 5.2 G/DL (ref 6.4–8.2)
Q-T INTERVAL, ECG07: 306 MS
Q-T INTERVAL, ECG07: 418 MS
QRS DURATION, ECG06: 72 MS
QRS DURATION, ECG06: 74 MS
QTC CALCULATION (BEZET), ECG08: 453 MS
QTC CALCULATION (BEZET), ECG08: 600 MS
RBC # BLD AUTO: 3.5 M/UL (ref 4.2–5.3)
SODIUM SERPL-SCNC: 140 MMOL/L (ref 136–145)
VENTRICULAR RATE, ECG03: 124 BPM
VENTRICULAR RATE, ECG03: 132 BPM
WBC # BLD AUTO: 9.6 K/UL (ref 4.6–13.2)

## 2018-06-10 PROCEDURE — 74011000258 HC RX REV CODE- 258: Performed by: INTERNAL MEDICINE

## 2018-06-10 PROCEDURE — 80053 COMPREHEN METABOLIC PANEL: CPT | Performed by: INTERNAL MEDICINE

## 2018-06-10 PROCEDURE — 83735 ASSAY OF MAGNESIUM: CPT | Performed by: INTERNAL MEDICINE

## 2018-06-10 PROCEDURE — 84100 ASSAY OF PHOSPHORUS: CPT | Performed by: INTERNAL MEDICINE

## 2018-06-10 PROCEDURE — 74011250637 HC RX REV CODE- 250/637: Performed by: INTERNAL MEDICINE

## 2018-06-10 PROCEDURE — 74011250636 HC RX REV CODE- 250/636: Performed by: INTERNAL MEDICINE

## 2018-06-10 PROCEDURE — 85730 THROMBOPLASTIN TIME PARTIAL: CPT | Performed by: INTERNAL MEDICINE

## 2018-06-10 PROCEDURE — 36415 COLL VENOUS BLD VENIPUNCTURE: CPT | Performed by: INTERNAL MEDICINE

## 2018-06-10 PROCEDURE — 65660000000 HC RM CCU STEPDOWN

## 2018-06-10 PROCEDURE — 82962 GLUCOSE BLOOD TEST: CPT

## 2018-06-10 PROCEDURE — 82550 ASSAY OF CK (CPK): CPT | Performed by: INTERNAL MEDICINE

## 2018-06-10 PROCEDURE — 74011000250 HC RX REV CODE- 250: Performed by: INTERNAL MEDICINE

## 2018-06-10 PROCEDURE — 85025 COMPLETE CBC W/AUTO DIFF WBC: CPT | Performed by: INTERNAL MEDICINE

## 2018-06-10 RX ORDER — HEPARIN SODIUM 1000 [USP'U]/ML
40 INJECTION, SOLUTION INTRAVENOUS; SUBCUTANEOUS ONCE
Status: COMPLETED | OUTPATIENT
Start: 2018-06-10 | End: 2018-06-10

## 2018-06-10 RX ADMIN — HEPARIN SODIUM 3660 UNITS: 1000 INJECTION, SOLUTION INTRAVENOUS; SUBCUTANEOUS at 13:23

## 2018-06-10 RX ADMIN — OXYCODONE HYDROCHLORIDE 5 MG: 5 TABLET ORAL at 00:36

## 2018-06-10 RX ADMIN — SODIUM CHLORIDE 75 ML/HR: 900 INJECTION, SOLUTION INTRAVENOUS at 19:07

## 2018-06-10 RX ADMIN — OXYCODONE HYDROCHLORIDE 5 MG: 5 TABLET ORAL at 14:42

## 2018-06-10 RX ADMIN — POTASSIUM CHLORIDE 10 MEQ: 10 TABLET, EXTENDED RELEASE ORAL at 00:01

## 2018-06-10 RX ADMIN — POTASSIUM & SODIUM PHOSPHATES POWDER PACK 280-160-250 MG 2 PACKET: 280-160-250 PACK at 00:01

## 2018-06-10 RX ADMIN — PIPERACILLIN SODIUM,TAZOBACTAM SODIUM 4.5 G: 4; .5 INJECTION, POWDER, FOR SOLUTION INTRAVENOUS at 02:35

## 2018-06-10 RX ADMIN — OXYCODONE HYDROCHLORIDE 5 MG: 5 TABLET ORAL at 22:52

## 2018-06-10 RX ADMIN — OXYCODONE HYDROCHLORIDE 5 MG: 5 TABLET ORAL at 18:07

## 2018-06-10 RX ADMIN — PIPERACILLIN SODIUM,TAZOBACTAM SODIUM 4.5 G: 4; .5 INJECTION, POWDER, FOR SOLUTION INTRAVENOUS at 08:36

## 2018-06-10 RX ADMIN — HEPARIN SODIUM AND DEXTROSE 17 UNITS/KG/HR: 10000; 5 INJECTION INTRAVENOUS at 13:23

## 2018-06-10 RX ADMIN — OXYCODONE HYDROCHLORIDE 5 MG: 5 TABLET ORAL at 05:28

## 2018-06-10 RX ADMIN — POTASSIUM & SODIUM PHOSPHATES POWDER PACK 280-160-250 MG 2 PACKET: 280-160-250 PACK at 02:36

## 2018-06-10 RX ADMIN — HEPARIN SODIUM AND DEXTROSE 13 UNITS/KG/HR: 10000; 5 INJECTION INTRAVENOUS at 00:06

## 2018-06-10 RX ADMIN — FAMOTIDINE 20 MG: 10 INJECTION, SOLUTION INTRAVENOUS at 08:35

## 2018-06-10 RX ADMIN — PIPERACILLIN SODIUM,TAZOBACTAM SODIUM 4.5 G: 4; .5 INJECTION, POWDER, FOR SOLUTION INTRAVENOUS at 13:26

## 2018-06-10 RX ADMIN — HEPARIN SODIUM 3620 UNITS: 1000 INJECTION, SOLUTION INTRAVENOUS; SUBCUTANEOUS at 05:28

## 2018-06-10 RX ADMIN — OXYCODONE HYDROCHLORIDE 5 MG: 5 TABLET ORAL at 09:59

## 2018-06-10 RX ADMIN — FAMOTIDINE 20 MG: 10 INJECTION, SOLUTION INTRAVENOUS at 21:06

## 2018-06-10 RX ADMIN — PIPERACILLIN SODIUM,TAZOBACTAM SODIUM 4.5 G: 4; .5 INJECTION, POWDER, FOR SOLUTION INTRAVENOUS at 21:06

## 2018-06-10 NOTE — PROGRESS NOTES
Problem: Falls - Risk of  Goal: *Absence of Falls  Document Shonna Fall Risk and appropriate interventions in the flowsheet. Outcome: Progressing Towards Goal  Fall Risk Interventions:       Mentation Interventions: Adequate sleep, hydration, pain control, Evaluate medications/consider consulting pharmacy    Medication Interventions: Evaluate medications/consider consulting pharmacy, Patient to call before getting OOB, Teach patient to arise slowly    Elimination Interventions: Call light in reach, Bed/chair exit alarm    History of Falls Interventions: Evaluate medications/consider consulting pharmacy        Problem: Pressure Injury - Risk of  Goal: *Prevention of pressure injury  Document Byron Scale and appropriate interventions in the flowsheet.    Outcome: Progressing Towards Goal  Pressure Injury Interventions:  Sensory Interventions: Assess changes in LOC, Maintain/enhance activity level    Moisture Interventions: Absorbent underpads, Maintain skin hydration (lotion/cream)    Activity Interventions: Pressure redistribution bed/mattress(bed type)    Mobility Interventions: Pressure redistribution bed/mattress (bed type), PT/OT evaluation    Nutrition Interventions: Document food/fluid/supplement intake

## 2018-06-10 NOTE — ROUTINE PROCESS
Bedside and Verbal shift change report given to [address]  (oncoming nurse) by Anna Rowe RN  (offgoing nurse). Report given with SBAR, Kardex, Intake/Output and Recent Results.

## 2018-06-10 NOTE — PROGRESS NOTES
Summary:  A&OX4. Darron remains at bedside for suicide watch risk. Bed alarm on for prevention of falls. Tachycardic. Urine output adequate. BLE edema noted, pulses found with doppler. Hep drip infusing, titration and bolus as needed. See MAR. Pt complains of generalized aches, primarily in left hip. medicated with prn oxycodone. See administration on MAR. VSS. Electrolytes replaced per protocol.  Pt transferred to telemetry for routine progression of care with darron
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
## 2018-06-10 NOTE — PROGRESS NOTES
TRANSFER - OUT REPORT:    Verbal report given to LOUIS Sanchez RN(name) on Tianna Purvis  being transferred to [facility](unit) for routine progression of care       Report consisted of patients Situation, Background, Assessment and   Recommendations(SBAR). Information from the following report(s) SBAR, Kardex and MAR was reviewed with the receiving nurse. Lines:   Peripheral IV 06/07/18 Left Forearm (Active)   Site Assessment Clean, dry, & intact 6/10/2018  3:00 AM   Phlebitis Assessment 0 6/10/2018  3:00 AM   Infiltration Assessment 0 6/10/2018  3:00 AM   Dressing Status Clean, dry, & intact 6/10/2018  3:00 AM   Dressing Type Transparent 6/10/2018  3:00 AM   Hub Color/Line Status Infusing 6/10/2018  3:00 AM   Action Taken Open ports on tubing capped 6/10/2018  3:00 AM   Alcohol Cap Used Yes 6/10/2018  3:00 AM       Peripheral IV 06/08/18 Right Antecubital (Active)   Site Assessment Clean, dry, & intact 6/10/2018  3:00 AM   Phlebitis Assessment 0 6/10/2018  3:00 AM   Infiltration Assessment 0 6/10/2018  3:00 AM   Dressing Status Clean, dry, & intact 6/10/2018  3:00 AM   Dressing Type Transparent 6/10/2018  3:00 AM   Hub Color/Line Status Infusing 6/10/2018  3:00 AM   Action Taken Open ports on tubing capped 6/10/2018  3:00 AM   Alcohol Cap Used Yes 6/10/2018  3:00 AM        Opportunity for questions and clarification was provided.

## 2018-06-10 NOTE — PROGRESS NOTES
0557 Pt arrived from ICU via bed to 340. Pt is on suicide precautions and has a one on one sitter. All suicide protocols reviewed and being followed. Pt is alert and denies pain currently. Assisted her to the [facility] x2 staff members. Pt had liquid brown stool with some blood but the patient is menstruating. She states its common for her to get diarrhea when menstruating. Pt is now sitting on the side of the bed with a CNA within arms reach. 0630 Poison control called for repeat liver enzyme results.
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
## 2018-06-11 LAB
ALBUMIN SERPL-MCNC: 2 G/DL (ref 3.4–5)
ALBUMIN/GLOB SERPL: 0.6 {RATIO} (ref 0.8–1.7)
ALP SERPL-CCNC: 66 U/L (ref 45–117)
ALT SERPL-CCNC: 100 U/L (ref 13–56)
ANION GAP SERPL CALC-SCNC: 8 MMOL/L (ref 3–18)
APTT PPP: 66.4 SEC (ref 23–36.4)
AST SERPL-CCNC: 205 U/L (ref 15–37)
BASOPHILS # BLD: 0 K/UL (ref 0–0.06)
BASOPHILS NFR BLD: 0 % (ref 0–2)
BILIRUB SERPL-MCNC: 0.6 MG/DL (ref 0.2–1)
BUN SERPL-MCNC: 4 MG/DL (ref 7–18)
BUN/CREAT SERPL: 7 (ref 12–20)
CALCIUM SERPL-MCNC: 8 MG/DL (ref 8.5–10.1)
CHLORIDE SERPL-SCNC: 106 MMOL/L (ref 100–108)
CK SERPL-CCNC: 6898 U/L (ref 26–192)
CO2 SERPL-SCNC: 26 MMOL/L (ref 21–32)
CREAT SERPL-MCNC: 0.59 MG/DL (ref 0.6–1.3)
DIFFERENTIAL METHOD BLD: ABNORMAL
EOSINOPHIL # BLD: 0.6 K/UL (ref 0–0.4)
EOSINOPHIL NFR BLD: 7 % (ref 0–5)
ERYTHROCYTE [DISTWIDTH] IN BLOOD BY AUTOMATED COUNT: 12.6 % (ref 11.6–14.5)
GLOBULIN SER CALC-MCNC: 3.3 G/DL (ref 2–4)
GLUCOSE BLD STRIP.AUTO-MCNC: 108 MG/DL (ref 70–110)
GLUCOSE BLD STRIP.AUTO-MCNC: 113 MG/DL (ref 70–110)
GLUCOSE BLD STRIP.AUTO-MCNC: 137 MG/DL (ref 70–110)
GLUCOSE BLD STRIP.AUTO-MCNC: 137 MG/DL (ref 70–110)
GLUCOSE SERPL-MCNC: 95 MG/DL (ref 74–99)
HCT VFR BLD AUTO: 30.2 % (ref 35–45)
HGB BLD-MCNC: 9.9 G/DL (ref 12–16)
LYMPHOCYTES # BLD: 2.2 K/UL (ref 0.9–3.6)
LYMPHOCYTES NFR BLD: 27 % (ref 21–52)
MAGNESIUM SERPL-MCNC: 1.9 MG/DL (ref 1.6–2.6)
MCH RBC QN AUTO: 29 PG (ref 24–34)
MCHC RBC AUTO-ENTMCNC: 32.8 G/DL (ref 31–37)
MCV RBC AUTO: 88.6 FL (ref 74–97)
MONOCYTES # BLD: 0.6 K/UL (ref 0.05–1.2)
MONOCYTES NFR BLD: 8 % (ref 3–10)
MYOGLOBIN UR-MCNC: 403 NG/ML (ref 0–13)
NEUTS SEG # BLD: 4.8 K/UL (ref 1.8–8)
NEUTS SEG NFR BLD: 58 % (ref 40–73)
PHOSPHATE SERPL-MCNC: 3 MG/DL (ref 2.5–4.9)
PLATELET # BLD AUTO: 202 K/UL (ref 135–420)
PMV BLD AUTO: 10.6 FL (ref 9.2–11.8)
POTASSIUM SERPL-SCNC: 3.7 MMOL/L (ref 3.5–5.5)
PROT SERPL-MCNC: 5.3 G/DL (ref 6.4–8.2)
RBC # BLD AUTO: 3.41 M/UL (ref 4.2–5.3)
SODIUM SERPL-SCNC: 140 MMOL/L (ref 136–145)
WBC # BLD AUTO: 8.3 K/UL (ref 4.6–13.2)

## 2018-06-11 PROCEDURE — 36415 COLL VENOUS BLD VENIPUNCTURE: CPT | Performed by: INTERNAL MEDICINE

## 2018-06-11 PROCEDURE — 74011250637 HC RX REV CODE- 250/637: Performed by: HOSPITALIST

## 2018-06-11 PROCEDURE — 83735 ASSAY OF MAGNESIUM: CPT | Performed by: INTERNAL MEDICINE

## 2018-06-11 PROCEDURE — 74011000250 HC RX REV CODE- 250: Performed by: INTERNAL MEDICINE

## 2018-06-11 PROCEDURE — 82550 ASSAY OF CK (CPK): CPT | Performed by: INTERNAL MEDICINE

## 2018-06-11 PROCEDURE — 74011000258 HC RX REV CODE- 258: Performed by: INTERNAL MEDICINE

## 2018-06-11 PROCEDURE — 85730 THROMBOPLASTIN TIME PARTIAL: CPT | Performed by: INTERNAL MEDICINE

## 2018-06-11 PROCEDURE — 74011250637 HC RX REV CODE- 250/637: Performed by: INTERNAL MEDICINE

## 2018-06-11 PROCEDURE — 82962 GLUCOSE BLOOD TEST: CPT

## 2018-06-11 PROCEDURE — 74011250636 HC RX REV CODE- 250/636: Performed by: INTERNAL MEDICINE

## 2018-06-11 PROCEDURE — 84100 ASSAY OF PHOSPHORUS: CPT | Performed by: INTERNAL MEDICINE

## 2018-06-11 PROCEDURE — 80053 COMPREHEN METABOLIC PANEL: CPT | Performed by: INTERNAL MEDICINE

## 2018-06-11 PROCEDURE — 85025 COMPLETE CBC W/AUTO DIFF WBC: CPT | Performed by: INTERNAL MEDICINE

## 2018-06-11 PROCEDURE — 65660000000 HC RM CCU STEPDOWN

## 2018-06-11 RX ORDER — HEPARIN SODIUM 1000 [USP'U]/ML
40 INJECTION, SOLUTION INTRAVENOUS; SUBCUTANEOUS ONCE
Status: COMPLETED | OUTPATIENT
Start: 2018-06-11 | End: 2018-06-11

## 2018-06-11 RX ORDER — DIPHENHYDRAMINE HCL 25 MG
25 CAPSULE ORAL ONCE
Status: COMPLETED | OUTPATIENT
Start: 2018-06-11 | End: 2018-06-11

## 2018-06-11 RX ADMIN — PIPERACILLIN SODIUM,TAZOBACTAM SODIUM 4.5 G: 4; .5 INJECTION, POWDER, FOR SOLUTION INTRAVENOUS at 14:07

## 2018-06-11 RX ADMIN — OXYCODONE HYDROCHLORIDE 5 MG: 5 TABLET ORAL at 07:23

## 2018-06-11 RX ADMIN — FAMOTIDINE 20 MG: 10 INJECTION, SOLUTION INTRAVENOUS at 09:00

## 2018-06-11 RX ADMIN — PIPERACILLIN SODIUM,TAZOBACTAM SODIUM 4.5 G: 4; .5 INJECTION, POWDER, FOR SOLUTION INTRAVENOUS at 10:28

## 2018-06-11 RX ADMIN — PIPERACILLIN SODIUM,TAZOBACTAM SODIUM 4.5 G: 4; .5 INJECTION, POWDER, FOR SOLUTION INTRAVENOUS at 21:01

## 2018-06-11 RX ADMIN — HEPARIN SODIUM 3660 UNITS: 1000 INJECTION, SOLUTION INTRAVENOUS; SUBCUTANEOUS at 05:11

## 2018-06-11 RX ADMIN — OXYCODONE HYDROCHLORIDE 5 MG: 5 TABLET ORAL at 15:05

## 2018-06-11 RX ADMIN — APIXABAN 5 MG: 5 TABLET, FILM COATED ORAL at 10:29

## 2018-06-11 RX ADMIN — PIPERACILLIN SODIUM,TAZOBACTAM SODIUM 4.5 G: 4; .5 INJECTION, POWDER, FOR SOLUTION INTRAVENOUS at 01:39

## 2018-06-11 RX ADMIN — APIXABAN 5 MG: 5 TABLET, FILM COATED ORAL at 21:01

## 2018-06-11 RX ADMIN — OXYCODONE HYDROCHLORIDE 5 MG: 5 TABLET ORAL at 10:29

## 2018-06-11 RX ADMIN — OXYCODONE HYDROCHLORIDE 5 MG: 5 TABLET ORAL at 23:56

## 2018-06-11 RX ADMIN — OXYCODONE HYDROCHLORIDE 5 MG: 5 TABLET ORAL at 03:04

## 2018-06-11 RX ADMIN — DIPHENHYDRAMINE HYDROCHLORIDE 25 MG: 25 CAPSULE ORAL at 21:01

## 2018-06-11 RX ADMIN — OXYCODONE HYDROCHLORIDE 5 MG: 5 TABLET ORAL at 20:00

## 2018-06-11 NOTE — ROUTINE PROCESS
Bedside and Verbal shift change report given to Doug Lehman RN  (oncoming nurse) by Prince Hoffmann RN  (offgoing nurse). Report given with SBAR, Kardex, Intake/Output and Recent Results.
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
## 2018-06-11 NOTE — PROGRESS NOTES
D/c plan TBD  Met with patient and IDr team at bedside. Patient remains with a sitter 1:1. Patient will need Psych eval Abigail Hahn will see where the Psych consult is. Patient wants to return home when medically ready. Patient will need to be evaluated and have safe plan for safe transition of care.  Appreciate psychs recommendations

## 2018-06-11 NOTE — PROGRESS NOTES
5454 Amisha Demarco in bed. One on one sitter at bedside. All suicide protocols are being followed. 2027 PTT therapeutic. No change in rate. Next PTT due in AM.         PTT    Collection Time: 06/10/18  7:25 PM   Result Value Ref Range    aPTT 81.7 (H) 23.0 - 36.4 SEC     0511 Ptt 66.4. Will give 40 units/kg IV bolus and increase the rate by 2 units/kg/hr. Now infusing at 19 units/kg/hr. PTT    Collection Time: 06/11/18  4:12 AM   Result Value Ref Range    aPTT 66.4 (H) 23.0 - 36.4 SEC     [id number] Shift Summary: Pt rested well overnight. No new clinical concerns noted.

## 2018-06-11 NOTE — PROGRESS NOTES
1000 Assumed care of patient from Adilene Dale RN. Assessment completed, patient is alert and oriented x's 4, c/o pain. PRN pain medication administered per patient's request. ST on the monitor with a HR in the low 100's. Lung fields are clear throughout on RA, 02 sat sustained in the mid 90's with no cough noted. Patient is tolerating a renal diet without any N/V or gastric distention. Corona removed per MD order, currently assessing patient for post void. Heparin gtt removed prior to administering eliquis. Patient is currently sitting up on side of bed with sitter at bedside, no s/s of any distress, VSS, call bell and personal belongings within reach, will continue to monitor. 1100     IDR/SLIDR Summary          Patient: Johnson Barba MRN: [id number]    Age: 57 y.o. YOB: 1970 Room/Bed: [facility]/   Admit Diagnosis: Overdose  Altered mental state  Principal Diagnosis: Overdose   Goals: safety  Readmission: NO  Quality Measure: Not applicable  VTE Prophylaxis: Chemical  Influenza Vaccine screening completed? YES  Pneumococcal Vaccine screening completed? YES  Mobility needs: No   Nutrition plan:Yes  Consults: P. T and Case Management    Financial concerns:Yes  Escalated to CM? YES  RRAT Score:    Interventions:H2H  Testing due for pt today? NO  LOS: 4 days Expected length of stay 2 days  Discharge plan: home   PCP: Hilda Vera MD  Transportation needs: No    Days before discharge:two or more days before discharge   Discharge disposition: Home    Signed:     Lobo Aldrich RN  6/11/2018  12:10 PM    1410 Scheduled medications administered as ordered without any complications. Patient is currently sitting up on side of bed after speaking with psych MD, patient is currently tearful concerning hospital to psych facility decision.  Sitter remain at bedside, will continue to monitor    1506 PRN pain medication administered per patient's request of pain to the right lower ext, no s/s of any distress, will continue to monitor    1722 NAD, will continue to monitor, sitter remain at bedside. 1915 Bedside and Verbal shift change report given to GUILLERMINA Arce (oncoming nurse) by Zenovia Phoenix, Rn (offgoing nurse). Report included the following information SBAR.

## 2018-06-11 NOTE — PROGRESS NOTES
Phone: [phone]  Paging : [phone]      Hematology / Oncology Progress Note    Admit Date: 2018    Assessment:     Principal Problem:    Overdose (2018)    Active Problems:    Depression ()      Altered mental state (2018)      Rhabdomyolysis (2018)      DVT (deep venous thrombosis) ([facility] [address] (2018)        Plan:     Not suspecting HIT  plt wnl  Can switch to eliquis when possible, renal function adequate  Having heavy period, which probably explains anemia. Will trend    Subjective:     Feeling much better.    Having a very heavy period    Objective:     Patient Vitals for the past 24 hrs:   BP Temp Pulse Resp SpO2 Weight   18 0802 110/65 98.6 °F (37 °C) (!) 103 18 97 % -   18 0401 107/61 98.1 °F (36.7 °C) 99 18 97 % -   18 0000 110/68 98.5 °F (36.9 °C) 95 18 100 % -   06/10/18 2224 104/55 99 °F (37.2 °C) (!) 107 18 95 % -   06/10/18 1858 122/50 98.3 °F (36.8 °C) (!) 113 18 99 % -   06/10/18 1600 94/43 98.2 °F (36.8 °C) (!) 108 18 100 % -   06/10/18 1136 - - - - - 91.6 kg (201 lb 15.1 oz)   06/10/18 1129 107/60 98.3 °F (36.8 °C) (!) 112 20 99 % -         Intake/Output Summary (Last 24 hours) at 18 0828  Last data filed at 18 0803   Gross per 24 hour   Intake             1330 ml   Output             5325 ml   Net            -3995 ml       Temp (24hrs), Av.4 °F (36.9 °C), Min:98.1 °F (36.7 °C), Max:99 °F (37.2 °C)       Medications:    Current Facility-Administered Medications   Medication Dose Route Frequency    0.9% sodium chloride infusion  75 mL/hr IntraVENous CONTINUOUS    piperacillin-tazobactam (ZOSYN) 4.5 g in 0.9% sodium chloride (MBP/ADV) 100 mL MBP  4.5 g IntraVENous Q6H    famotidine (PF) (PEPCID) 20 mg in sodium chloride 0.9% 10 mL injection  20 mg IntraVENous Q12H    nicotine (NICODERM CQ) 14 mg/24 hr patch 1 Patch  1 Patch TransDERmal DAILY    heparin 25,000 units in D5W 250 ml infusion  18-36 Units/kg/hr IntraVENous TITRATE    ELECTROLYTE REPLACEMENT PROTOCOL- Potasium Standard Dosing  1 Each Other PRN    ELECTROLYTE REPLACEMENT PROTOCOL- Magnesium  1 Each Other PRN    ELECTROLYTE REPLACEMENT PROTOCOL- Phosphorus Standard Dosing  1 Each Other PRN    ELECTROLYTE REPLACEMENT PROTOCOL- Calcium  1 Each Other PRN    oxyCODONE IR (ROXICODONE) tablet 5 mg  5 mg Oral Q4H PRN    ondansetron (ZOFRAN) injection 4 mg  4 mg IntraVENous Q4H PRN       Allergies: Allergies   Allergen Reactions    Aspirin Other (comments)     Bleeding ulcers    Aspirin (Bulk) Nausea Only    Ibuprofen (Bulk) Nausea Only       ROS:    Constitutional: No fever, chills, diaphoresis, activity change, appetite change, fatigue or unexpected weight change. HEENT: No sore throat, rhinorrhea,or visual disturbance. Respiratory: No cough, chest tightness, shortness of breath. Cardiovascular:No chest pain, palpitations. Gastrointestinal:No nausea, vomiting, diarrhea, constipation, early satiety, abdominal distention, abdominal pain. Genitourinary: No dysuria, urgency, frequency, hematuria, flank pain, difficulty urinating. Neurological: No headache, dizziness, weakness, tingling and numbness or fall. Musculoskeletal: No bone pain or back pain. No arthralgia or myalgia. No bruise/bleed easily. No extremity swelling.     ECOG PS:    Physical Exam:    General: Alert , Oriented, in no distress  HEENT: no pallor, anicteric sclera,  Heart: regular rate, and rhythm, without murmur, gallop or rubbing  Lungs:breathing comfortably on room air, clear to auscultation and percussion bilaterally  ABD: bowel sound present, soft, nondistended, nontender, no hepatosplenomegaly or mass  Extremities: warm, well perfused, no edema  MSK: no tenderness along the spine or long bones  Skin: No rash  Neuro: non-focal    Recent Results (from the past 24 hour(s))   GLUCOSE, POC    Collection Time: 06/10/18 11:32 AM   Result Value Ref Range    Glucose (POC) 105 70 - 110 mg/dL PTT    Collection Time: 06/10/18 11:37 AM   Result Value Ref Range    aPTT 62.2 (H) 23.0 - 36.4 SEC   GLUCOSE, POC    Collection Time: 06/10/18  4:28 PM   Result Value Ref Range    Glucose (POC) 110 70 - 110 mg/dL   PTT    Collection Time: 06/10/18  7:25 PM   Result Value Ref Range    aPTT 81.7 (H) 23.0 - 36.4 SEC   GLUCOSE, POC    Collection Time: 06/10/18  9:13 PM   Result Value Ref Range    Glucose (POC) 98 70 - 110 mg/dL   CK    Collection Time: 06/11/18  4:12 AM   Result Value Ref Range    CK 6898 (H) 26 - 192 U/L   MAGNESIUM    Collection Time: 06/11/18  4:12 AM   Result Value Ref Range    Magnesium 1.9 1.6 - 2.6 mg/dL   PHOSPHORUS    Collection Time: 06/11/18  4:12 AM   Result Value Ref Range    Phosphorus 3.0 2.5 - 4.9 MG/DL   METABOLIC PANEL, COMPREHENSIVE    Collection Time: 06/11/18  4:12 AM   Result Value Ref Range    Sodium 140 136 - 145 mmol/L    Potassium 3.7 3.5 - 5.5 mmol/L    Chloride 106 100 - 108 mmol/L    CO2 26 21 - 32 mmol/L    Anion gap 8 3.0 - 18 mmol/L    Glucose 95 74 - 99 mg/dL    BUN 4 (L) 7.0 - 18 MG/DL    Creatinine 0.59 (L) 0.6 - 1.3 MG/DL    BUN/Creatinine ratio 7 (L) 12 - 20      GFR est AA >60 >60 ml/min/1.73m2    GFR est non-AA >60 >60 ml/min/1.73m2    Calcium 8.0 (L) 8.5 - 10.1 MG/DL    Bilirubin, total 0.6 0.2 - 1.0 MG/DL    ALT (SGPT) 100 (H) 13 - 56 U/L    AST (SGOT) 205 (H) 15 - 37 U/L    Alk.  phosphatase 66 45 - 117 U/L    Protein, total 5.3 (L) 6.4 - 8.2 g/dL    Albumin 2.0 (L) 3.4 - 5.0 g/dL    Globulin 3.3 2.0 - 4.0 g/dL    A-G Ratio 0.6 (L) 0.8 - 1.7     CBC WITH AUTOMATED DIFF    Collection Time: 06/11/18  4:12 AM   Result Value Ref Range    WBC 8.3 4.6 - 13.2 K/uL    RBC 3.41 (L) 4.20 - 5.30 M/uL    HGB 9.9 (L) 12.0 - 16.0 g/dL    HCT 30.2 (L) 35.0 - 45.0 %    MCV 88.6 74.0 - 97.0 FL    MCH 29.0 24.0 - 34.0 PG    MCHC 32.8 31.0 - 37.0 g/dL    RDW 12.6 11.6 - 14.5 %    PLATELET 656 127 - 910 K/uL    MPV 10.6 9.2 - 11.8 FL    NEUTROPHILS 58 40 - 73 %    LYMPHOCYTES 27 21 - 52 %    MONOCYTES 8 3 - 10 %    EOSINOPHILS 7 (H) 0 - 5 %    BASOPHILS 0 0 - 2 %    ABS. NEUTROPHILS 4.8 1.8 - 8.0 K/UL    ABS. LYMPHOCYTES 2.2 0.9 - 3.6 K/UL    ABS. MONOCYTES 0.6 0.05 - 1.2 K/UL    ABS. EOSINOPHILS 0.6 (H) 0.0 - 0.4 K/UL    ABS. BASOPHILS 0.0 0.0 - 0.06 K/UL    DF AUTOMATED     PTT    Collection Time: 06/11/18  4:12 AM   Result Value Ref Range    aPTT 66.4 (H) 23.0 - 36.4 SEC   GLUCOSE, POC    Collection Time: 06/11/18  8:07 AM   Result Value Ref Range    Glucose (POC) 108 70 - 110 mg/dL       Radiology: Xr Hip Lt W Or Wo Pelv 2-3 Vws    Result Date: 6/9/2018  Examination: Left hip 2 views. HISTORY: Left hip pain FINDINGS: An AP projection of the pelvis on 2 exposures as well as a frog-leg lateral view the left hip obtained and interpreted without comparison. Bony pelvis appears intact. Mild degenerative change at the pubic symphysis noted. Mild left hip joint osteoarthritis. No fracture. IMPRESSION: 1. Mild left hip joint osteoarthritis without evidence of fracture or acute malalignment. Ct Head Wo Cont    Result Date: 6/8/2018  EXAM: CT head INDICATION: Altered mental status. Diminished consciousness. COMPARISON: None. TECHNIQUE: Axial CT imaging of the head was performed without intravenous contrast. Dose reduction techniques used: automated exposure control, adjustment of the mAs and/or kVp according to patient size, and iterative reconstruction techniques. _______________ FINDINGS: BRAIN AND POSTERIOR FOSSA: The sulci, folia, ventricles and basal cisterns are within normal limits for the patient's age. There is no intracranial hemorrhage, mass effect, or midline shift. There are no areas of abnormal parenchymal attenuation. EXTRA-AXIAL SPACES AND MENINGES: There are no abnormal extra-axial fluid collections. CALVARIUM: Intact. SINUSES: Clear. OTHER: None. _______________     IMPRESSION: No acute intracranial abnormalities.     [address] [place] [address]    Result Date: 6/8/2018  EXAM: Limited right upper quadrant abdominal ultrasound INDICATION: Right upper quadrant pain. COMPARISON: None. TECHNIQUE: Real-time right upper quadrant sonography in multiple planes was performed with image documentation. Grayscale, color flow Doppler imaging, and velocity spectral waveform analysis of the portal vein was performed (duplex imaging). _______________ FINDINGS: LIVER: Liver is normal  in size, largest transverse dimension of the right hepatic lobe measuring 18 cm. Heterogeneous hepatic echotexture. No focal mass. Color flow Doppler and velocity spectral waveform analysis of the portal vein shows normal (hepatopedal) direction of flow. Portal vein measures 0.5 cm. BILIARY SYSTEM: No intrahepatic biliary dilatation. Common bile duct is normal in caliber measuring 4 mm. GALLBLADDER: No gallstones or gallbladder wall thickening. No pericholecystic fluid. Negative sonographic Lee's sign reported by technologist. RIGHT KIDNEY: Normal in size, measuring 10.2 cm in length. Cortical thickness and echogenicity is within normal limits. No hydronephrosis or renal mass. No visible calculi. PANCREAS: Head and body are unremarkable in appearance though the tail is obscured by overlying bowel gas. IVC: Visualized portions are unremarkable in appearance. OTHER: No free intraperitoneal fluid. _______________     IMPRESSION: 1. Unremarkable gallbladder, right kidney and visualized pancreas. 2. Heterogeneous hepatic echotexture suggesting hepatocellular disease. No focal hepatic lesion. Xr Chest Port    Result Date: 6/9/2018  Chest, single view Indication: Hypoxia Comparison: June 7, 2018 Findings:  Portable upright AP view of the chest was obtained. Minimal linear opacities are present at each lung base, with improved aeration from prior days exam. No pneumothorax or pleural effusion. Cardiac size and mediastinal contours are stable. No acute osseous abnormality.      Impression: Improved pulmonary expansion, with mild bibasilar atelectasis. Xr Chest Port    Result Date: 6/8/2018  --------------------------------------------------------------------------- <<<<<<<<<           [facility]  [facility]           >>>>>>>>> --------------------------------------------------------------------------- CLINICAL HISTORY:  Altered mental status. COMPARISON EXAMINATIONS:  None. ---  SINGLE FRONTAL VIEW OF THE CHEST  --- There appears to be interstitial coarsening/scarring at the lung bases. The lungs and pleural spaces are otherwise clear. The mediastinum is unremarkable in appearance. No significant osseous abnormalities are identified.  --------------    Impression: -------------- No active pulmonary disease.       Kennedy Kilgore MD, PhD  Office: [phone]  Cell: [phone]

## 2018-06-11 NOTE — ACP (ADVANCE CARE PLANNING)
is awaiting the results of a Psych consult prior to addressing [address] with patient. Rev.  [facility]  [phone]
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
## 2018-06-12 ENCOUNTER — HOSPITAL ENCOUNTER (INPATIENT)
Age: 48
LOS: 2 days | Discharge: HOME | DRG: 885 | End: 2018-06-14
Attending: PSYCHIATRY & NEUROLOGY | Admitting: PSYCHIATRY & NEUROLOGY
Payer: SELF-PAY

## 2018-06-12 ENCOUNTER — APPOINTMENT (OUTPATIENT)
Dept: GENERAL RADIOLOGY | Age: 48
DRG: 885 | End: 2018-06-12
Attending: FAMILY MEDICINE
Payer: SELF-PAY

## 2018-06-12 VITALS
HEART RATE: 88 BPM | OXYGEN SATURATION: 100 % | BODY MASS INDEX: 38.55 KG/M2 | WEIGHT: 204.2 LBS | SYSTOLIC BLOOD PRESSURE: 122 MMHG | HEIGHT: 61 IN | TEMPERATURE: 97.5 F | DIASTOLIC BLOOD PRESSURE: 71 MMHG | RESPIRATION RATE: 18 BRPM

## 2018-06-12 LAB
ALBUMIN SERPL-MCNC: 2.1 G/DL (ref 3.4–5)
ALBUMIN/GLOB SERPL: 0.6 {RATIO} (ref 0.8–1.7)
ALP SERPL-CCNC: 87 U/L (ref 45–117)
ALT SERPL-CCNC: 106 U/L (ref 13–56)
ANION GAP SERPL CALC-SCNC: 9 MMOL/L (ref 3–18)
AST SERPL-CCNC: 143 U/L (ref 15–37)
BASOPHILS # BLD: 0 K/UL (ref 0–0.06)
BASOPHILS NFR BLD: 1 % (ref 0–2)
BILIRUB SERPL-MCNC: 0.3 MG/DL (ref 0.2–1)
BUN SERPL-MCNC: 9 MG/DL (ref 7–18)
BUN/CREAT SERPL: 17 (ref 12–20)
CALCIUM SERPL-MCNC: 8.4 MG/DL (ref 8.5–10.1)
CHLORIDE SERPL-SCNC: 106 MMOL/L (ref 100–108)
CK SERPL-CCNC: 3429 U/L (ref 26–192)
CO2 SERPL-SCNC: 25 MMOL/L (ref 21–32)
CREAT SERPL-MCNC: 0.53 MG/DL (ref 0.6–1.3)
DIFFERENTIAL METHOD BLD: ABNORMAL
EOSINOPHIL # BLD: 0.4 K/UL (ref 0–0.4)
EOSINOPHIL NFR BLD: 6 % (ref 0–5)
ERYTHROCYTE [DISTWIDTH] IN BLOOD BY AUTOMATED COUNT: 12.6 % (ref 11.6–14.5)
GLOBULIN SER CALC-MCNC: 3.7 G/DL (ref 2–4)
GLUCOSE BLD STRIP.AUTO-MCNC: 108 MG/DL (ref 70–110)
GLUCOSE SERPL-MCNC: 91 MG/DL (ref 74–99)
HCT VFR BLD AUTO: 32.4 % (ref 35–45)
HGB BLD-MCNC: 10.3 G/DL (ref 12–16)
LYMPHOCYTES # BLD: 1.5 K/UL (ref 0.9–3.6)
LYMPHOCYTES NFR BLD: 20 % (ref 21–52)
MAGNESIUM SERPL-MCNC: 1.8 MG/DL (ref 1.6–2.6)
MCH RBC QN AUTO: 28.1 PG (ref 24–34)
MCHC RBC AUTO-ENTMCNC: 31.8 G/DL (ref 31–37)
MCV RBC AUTO: 88.3 FL (ref 74–97)
MONOCYTES # BLD: 0.5 K/UL (ref 0.05–1.2)
MONOCYTES NFR BLD: 7 % (ref 3–10)
NEUTS SEG # BLD: 4.8 K/UL (ref 1.8–8)
NEUTS SEG NFR BLD: 66 % (ref 40–73)
PF4 HEPARIN CMPLX AB SER-ACNC: 0.37 OD (ref 0–0.4)
PHOSPHATE SERPL-MCNC: 3.2 MG/DL (ref 2.5–4.9)
PLATELET # BLD AUTO: 245 K/UL (ref 135–420)
PMV BLD AUTO: 10 FL (ref 9.2–11.8)
POTASSIUM SERPL-SCNC: 4 MMOL/L (ref 3.5–5.5)
PROT SERPL-MCNC: 5.8 G/DL (ref 6.4–8.2)
RBC # BLD AUTO: 3.67 M/UL (ref 4.2–5.3)
SODIUM SERPL-SCNC: 140 MMOL/L (ref 136–145)
WBC # BLD AUTO: 7.3 K/UL (ref 4.6–13.2)

## 2018-06-12 PROCEDURE — 84100 ASSAY OF PHOSPHORUS: CPT | Performed by: INTERNAL MEDICINE

## 2018-06-12 PROCEDURE — 74011000258 HC RX REV CODE- 258: Performed by: INTERNAL MEDICINE

## 2018-06-12 PROCEDURE — 83735 ASSAY OF MAGNESIUM: CPT | Performed by: INTERNAL MEDICINE

## 2018-06-12 PROCEDURE — 74011250637 HC RX REV CODE- 250/637: Performed by: HOSPITALIST

## 2018-06-12 PROCEDURE — 65220000003 HC RM SEMIPRIVATE PSYCH

## 2018-06-12 PROCEDURE — 74011250637 HC RX REV CODE- 250/637: Performed by: INTERNAL MEDICINE

## 2018-06-12 PROCEDURE — 74011250636 HC RX REV CODE- 250/636: Performed by: INTERNAL MEDICINE

## 2018-06-12 PROCEDURE — 74011250637 HC RX REV CODE- 250/637: Performed by: PSYCHIATRY & NEUROLOGY

## 2018-06-12 PROCEDURE — 82550 ASSAY OF CK (CPK): CPT | Performed by: INTERNAL MEDICINE

## 2018-06-12 PROCEDURE — 80053 COMPREHEN METABOLIC PANEL: CPT | Performed by: INTERNAL MEDICINE

## 2018-06-12 PROCEDURE — 82962 GLUCOSE BLOOD TEST: CPT

## 2018-06-12 PROCEDURE — 36415 COLL VENOUS BLD VENIPUNCTURE: CPT | Performed by: INTERNAL MEDICINE

## 2018-06-12 PROCEDURE — 85025 COMPLETE CBC W/AUTO DIFF WBC: CPT | Performed by: INTERNAL MEDICINE

## 2018-06-12 RX ORDER — FOLIC ACID 1 MG/1
1 TABLET ORAL DAILY
Status: DISCONTINUED | OUTPATIENT
Start: 2018-06-13 | End: 2018-06-13 | Stop reason: SDUPTHER

## 2018-06-12 RX ORDER — IBUPROFEN 200 MG
1 TABLET ORAL DAILY
Status: DISCONTINUED | OUTPATIENT
Start: 2018-06-13 | End: 2018-06-14 | Stop reason: HOSPADM

## 2018-06-12 RX ORDER — LORAZEPAM 1 MG/1
1 TABLET ORAL
Status: DISCONTINUED | OUTPATIENT
Start: 2018-06-12 | End: 2018-06-14 | Stop reason: HOSPADM

## 2018-06-12 RX ORDER — IBUPROFEN 200 MG
1 TABLET ORAL
Status: DISCONTINUED | OUTPATIENT
Start: 2018-06-12 | End: 2018-06-14 | Stop reason: HOSPADM

## 2018-06-12 RX ORDER — SODIUM CHLORIDE 9 MG/ML
50 INJECTION, SOLUTION INTRAVENOUS CONTINUOUS
Status: DISCONTINUED | OUTPATIENT
Start: 2018-06-12 | End: 2018-06-13

## 2018-06-12 RX ORDER — ADHESIVE BANDAGE
30 BANDAGE TOPICAL DAILY PRN
Status: DISCONTINUED | OUTPATIENT
Start: 2018-06-12 | End: 2018-06-14 | Stop reason: HOSPADM

## 2018-06-12 RX ORDER — ACETAMINOPHEN 325 MG/1
650 TABLET ORAL
Status: DISCONTINUED | OUTPATIENT
Start: 2018-06-12 | End: 2018-06-12

## 2018-06-12 RX ORDER — IBUPROFEN 200 MG
1 TABLET ORAL DAILY
Qty: 30 PATCH | Refills: 0 | Status: SHIPPED
Start: 2018-06-12 | End: 2018-06-14

## 2018-06-12 RX ORDER — BENZTROPINE MESYLATE 2 MG/1
2 TABLET ORAL
Status: DISCONTINUED | OUTPATIENT
Start: 2018-06-12 | End: 2018-06-14 | Stop reason: HOSPADM

## 2018-06-12 RX ORDER — OLANZAPINE 5 MG/1
5 TABLET ORAL
Status: DISCONTINUED | OUTPATIENT
Start: 2018-06-12 | End: 2018-06-14 | Stop reason: HOSPADM

## 2018-06-12 RX ORDER — LORAZEPAM 2 MG/ML
2 INJECTION INTRAMUSCULAR
Status: DISCONTINUED | OUTPATIENT
Start: 2018-06-12 | End: 2018-06-14 | Stop reason: HOSPADM

## 2018-06-12 RX ORDER — ZOLPIDEM TARTRATE 5 MG/1
5 TABLET ORAL
Status: DISCONTINUED | OUTPATIENT
Start: 2018-06-12 | End: 2018-06-14 | Stop reason: HOSPADM

## 2018-06-12 RX ORDER — BENZTROPINE MESYLATE 1 MG/ML
2 INJECTION INTRAMUSCULAR; INTRAVENOUS
Status: DISCONTINUED | OUTPATIENT
Start: 2018-06-12 | End: 2018-06-14 | Stop reason: HOSPADM

## 2018-06-12 RX ADMIN — APIXABAN 5 MG: 5 TABLET, FILM COATED ORAL at 21:04

## 2018-06-12 RX ADMIN — PIPERACILLIN SODIUM,TAZOBACTAM SODIUM 4.5 G: 4; .5 INJECTION, POWDER, FOR SOLUTION INTRAVENOUS at 09:11

## 2018-06-12 RX ADMIN — APIXABAN 5 MG: 5 TABLET, FILM COATED ORAL at 09:11

## 2018-06-12 RX ADMIN — OXYCODONE HYDROCHLORIDE 5 MG: 5 TABLET ORAL at 05:03

## 2018-06-12 RX ADMIN — OXYCODONE HYDROCHLORIDE 5 MG: 5 TABLET ORAL at 13:18

## 2018-06-12 RX ADMIN — OXYCODONE HYDROCHLORIDE 5 MG: 5 TABLET ORAL at 09:11

## 2018-06-12 RX ADMIN — ACETAMINOPHEN 650 MG: 325 TABLET ORAL at 21:04

## 2018-06-12 RX ADMIN — PIPERACILLIN SODIUM,TAZOBACTAM SODIUM 4.5 G: 4; .5 INJECTION, POWDER, FOR SOLUTION INTRAVENOUS at 05:03

## 2018-06-12 RX ADMIN — LORAZEPAM 1 MG: 1 TABLET ORAL at 21:04

## 2018-06-12 NOTE — BH NOTES
TRANSFER - IN REPORT:    Verbal report received from Cristina(name) on [employer]  being received from [facility]) for routine progression of care      Report consisted of patients Situation, Background, Assessment and   Recommendations(SBAR). Information from the following report(s) SBAR was reviewed with the receiving nurse. Opportunity for questions and clarification was provided. Assessment completed upon patients arrival to unit and care assumed.

## 2018-06-12 NOTE — IP AVS SNAPSHOT
[address] [place] [address] 
[phone] Patient: Will Louise MRN: [id number] :1970 A check reymundo indicates which time of day the medication should be taken. My Medications CONTINUE taking these medications Instructions Each Dose to Equal  
 Morning Noon Evening Bedtime  
 apixaban 5 mg tablet Commonly known as:  Verpacheco Brooke Your next dose is: Today at 9pm  
   
 Take 1 Tab by mouth two (2) times a day. 5 mg  
    
  
   
   
   
  
 folic acid 1 mg tablet Commonly known as:  [employer] Your next dose is:  Tomorrow at 9am  
   
 Take 1 mg by mouth daily. 1 mg LORazepam 2 mg tablet Commonly known as:  ATIVAN Your next dose is: Today at 5pm and bedtime Take half tablet every morning and evening and 1 tablet at bedtime STOP taking these medications   
 gabapentin 400 mg capsule Commonly known as:  NEURONTIN  
   
  
 LASIX 20 mg tablet Generic drug:  furosemide  
   
  
 nicotine 14 mg/24 hr patch Commonly known as:  NICODERM CQ  
   
  
 OLANZapine 15 mg tablet Commonly known as:  ZyPREXA  
   
  
 VITAMIN C 500 mg tablet Generic drug:  ascorbic acid (vitamin C) VITAMIN D2 50,000 unit capsule Generic drug:  ergocalciferol ASK your doctor about these medications Instructions Each Dose to Equal  
 Morning Noon Evening Bedtime FISH OIL 1,000 mg Cap Generic drug:  omega-3 fatty acids-vitamin e Your next dose is:  Tomorrow at 9am  
   
 Take 1 Cap by mouth daily. 1 Cap

## 2018-06-12 NOTE — PROGRESS NOTES
Skin Assessment performed by: TRINA, RN and P.M., RN    Pt has a nicotine on her left shoulder. Lower extremities are swollen bilaterally. Right lower janie is 2+pitting edema. Skin is intact. Pressure Ulcer Documentation  (COMPLETE ONE LABEL PER PRESSURE ULCER)  For further information, please review corresponding Wound Care flowsheet. Berny Espinosa has:    No pressure injury noted and pressure ulcer prevention initiated.

## 2018-06-12 NOTE — PROGRESS NOTES
1915 Call placed to hospitalist answering service requesting return call from MD who is taking floor consults.  states Dr. SABRINA YI is on call. 200  Dr. SABRINA YI called back and states that he is not on call. He states Dr. Johnnie Finn is on call. Writer calls Hospitalist answering service to request call from Dr. Johnnie Finn. 2034  Dr. Johnnie Finn returned call and was notified of consult.

## 2018-06-12 NOTE — PROGRESS NOTES
D/c plan: psychiatric facility  Met with patient and Dr. Marquis Johnson. Patient is medically cleared and ready for d/c: However, Psychiatrist saw patient and he recommends her for voluntary admission to psychiatric unit. If patient does not go voluntarily he recommends calling [facility] for TDO. However, at this time she is agreeable to voluntary admission. He does recommend while in the hospital she has a sitter. CM has placed phone call to [facility] [phone] @ [facility]. Spoke with Hamiltonmonica Jody she informed cm that she has maybe one female d/c today and that will be plan A. Plan B will be [facility]. She wants cm to find out if patient is willing to go to [facility]. She will call cm back. 1400 telephone call from Massimo Perera she needed to find out if patient will be on antibiotics confirmed with Dr. Marquis Johnson patient will not be. She will call cm back and update patient is willing to go to [facility] if she has to. Cm will continue to follow. 1450 telephone call from Cristhian Mohan [phone] she has received a accepting facility and psychiatrist. Dr. aMrquis Johnson is aware. Patient has been accepted by Dr. Reginaldo Dolan at [address] [place] [address], [address] RN please call report to [phone]. patient will go to room 729 bed 2. Transportation has been arranged bls for 1630. Please include all hard scripts for controlled substances, med rec and dc summary in packet. Please medicate for pain prior to dc if possible and needed to help offset delay when patient first arrives to facility. Care Management Interventions  PCP Verified by CM: Yes  Mode of Transport at Discharge: S  Transition of Care Consult (CM Consult):  (psychiatirc hospital)  Confirm Follow Up Transport:  (will need transport)  Plan discussed with Pt/Family/Caregiver: Yes  Freedom of Choice Offered: Yes  Discharge Location  Discharge Placement: Psychiatric Unit  Care Management Interventions  PCP Verified by CM:  Yes  Mode of Transport at Discharge: BLS  Transition of Care Consult (CM Consult):  (psychiatirc hospital)  Confirm Follow Up Transport:  (will need transport)  Plan discussed with Pt/Family/Caregiver: Yes  Freedom of Choice Offered: Yes  Discharge Location  Discharge Placement: Psychiatric Unit

## 2018-06-12 NOTE — IP AVS SNAPSHOT
[address] 
[phone] Patient: Mann Molina MRN: [id number] :1970 About your hospitalization You were admitted on:  2018 You last received care in the:  [address] [place] [address] You were discharged on:  2018 Why you were hospitalized Your primary diagnosis was:  Bipolar Disorder (Hcc) Your diagnoses also included:  Bipolar Affective (Hcc) Follow-up Information Follow up With Details Comments Contact Info Thuy Solares MD   [facility] [phone] 
[phone] Obinna Shepard On 2018 You have a 5:00pm appointment for individual therapy. [address] [place] [address], [phone] 
[phone] Dr. Flori Dukes On 2018 You have a 10:00am appointment with the psychiatrist. Please call the office if you need a refill of medications in July. [address] [place] [address], [address] 
[phone] Thuy Solares MD  follow up within 7 to 10 days to review medication changes and addition to elquis to medication regmement [place] [phone] [phone] Discharge Orders None A check reymundo indicates which time of day the medication should be taken. My Medications CONTINUE taking these medications Instructions Each Dose to Equal  
 Morning Noon Evening Bedtime  
 apixaban 5 mg tablet Commonly known as:  Sada Geronimo Your next dose is: Today at 9pm  
   
 Take 1 Tab by mouth two (2) times a day. 5 mg  
    
  
   
   
   
  
 folic acid 1 mg tablet Commonly known as:  Google Your next dose is:  Tomorrow at 9am  
   
 Take 1 mg by mouth daily. 1 mg LORazepam 2 mg tablet Commonly known as:  ATIVAN  
 Your next dose is: Today at 5pm and bedtime Take half tablet every morning and evening and 1 tablet at bedtime STOP taking these medications   
 gabapentin 400 mg capsule Commonly known as:  NEURONTIN  
   
  
 LASIX 20 mg tablet Generic drug:  furosemide  
   
  
 nicotine 14 mg/24 hr patch Commonly known as:  NICODERM CQ  
   
  
 OLANZapine 15 mg tablet Commonly known as:  ZyPREXA  
   
  
 VITAMIN C 500 mg tablet Generic drug:  ascorbic acid (vitamin C) VITAMIN D2 50,000 unit capsule Generic drug:  ergocalciferol ASK your doctor about these medications Instructions Each Dose to Equal  
 Morning Noon Evening Bedtime FISH OIL 1,000 mg Cap Generic drug:  omega-3 fatty acids-vitamin e Your next dose is:  Tomorrow at 9am  
   
 Take 1 Cap by mouth daily. 1 Cap Discharge Instructions DISCHARGE SUMMARY 
 
NAME:Tianna Purvis : 1970 MRN: [id number] The patient Sergey Frank exhibits the ability to control behavior in a less restrictive environment. Patient's level of functioning is improving. No assaultive/destructive behavior has been observed for the past 24 hours. No suicidal/homicidal threat or behavior has been observed for the past 24 hours. There is no evidence of serious medication side effects. Patient has not been in physical or protective restraints for at least the past 24 hours. If weapons involved, how are they secured? No weapons involved. Is patient aware of and in agreement with discharge plan? Yes Arrangements for medication:  Prescriptions given to patient. Referral for substance abuse treatment? Patient is not using substances/Not applicable. Referral for smoking cessation needed? Yes, refused. Copy of discharge instructions to provider?:  Dr. Chanelle Gama ([phone]) Arrangements for transportation home:  Family to . Keep all follow up appointments as scheduled, continue to take prescribed medications per physician instructions. Mental health crisis number:  704 or your local mental health crisis line number at [phone]. DISCHARGE SUMMARY from Nurse PATIENT INSTRUCTIONS: 
 
What to do at Home: 
Recommended activity: Activity as tolerated, If you experience any of the following symptoms thoughts of harming self, feeling overwhelmed with anxiety, hopelessness or loneliness, please follow up with your assigned providers and local crisis number. *  Please give a list of your current medications to your Primary Care Provider. *  Please update this list whenever your medications are discontinued, doses are 
    changed, or new medications (including over-the-counter products) are added. *  Please carry medication information at all times in case of emergency situations. These are general instructions for a healthy lifestyle: No smoking/ No tobacco products/ Avoid exposure to second hand smoke Surgeon General's Warning:  Quitting smoking now greatly reduces serious risk to your health. Obesity, smoking, and sedentary lifestyle greatly increases your risk for illness A healthy diet, regular physical exercise & weight monitoring are important for maintaining a healthy lifestyle You may be retaining fluid if you have a history of heart failure or if you experience any of the following symptoms:  Weight gain of 3 pounds or more overnight or 5 pounds in a week, increased swelling in our hands or feet or shortness of breath while lying flat in bed. Please call your doctor as soon as you notice any of these symptoms; do not wait until your next office visit. Recognize signs and symptoms of STROKE: 
 
F-face looks uneven A-arms unable to move or move unevenly S-speech slurred or non-existent T-time-call 911 as soon as signs and symptoms begin-DO NOT go Back to bed or wait to see if you get better-TIME IS BRAIN. Warning Signs of HEART ATTACK Call 911 if you have these symptoms: 
? Chest discomfort. Most heart attacks involve discomfort in the center of the chest that lasts more than a few minutes, or that goes away and comes back. It can feel like uncomfortable pressure, squeezing, fullness, or pain. ? Discomfort in other areas of the upper body. Symptoms can include pain or discomfort in one or both arms, the back, neck, jaw, or stomach. ? Shortness of breath with or without chest discomfort. ? Other signs may include breaking out in a cold sweat, nausea, or lightheadedness. Don't wait more than five minutes to call [address]! Fast action can save your life. Calling 911 is almost always the fastest way to get lifesaving treatment. Emergency Medical Services staff can begin treatment when they arrive  up to an hour sooner than if someone gets to the hospital by car. The discharge information has been reviewed with the patient. The patient verbalized understanding. Discharge medications reviewed with the patient and appropriate educational materials and side effects teaching were provided. ___________________________________________________________________________________________________________________________________ Promobuckethart Announcement We are excited to announce that we are making your provider's discharge notes available to you in Promobuckethart. You will see these notes when they are completed and signed by the physician that discharged you from your recent hospital stay. If you have any questions or concerns about any information you see in Promobuckethart, please call the Health Information Department where you were seen or reach out to your Primary Care Provider for more information about your plan of care. Introducing [facility]! [employer] introduces [employer] patient portal. Now you can access parts of your medical record, email your doctor's office, and request medication refills online. 1. In your internet browser, go to [email or web address] 2. Click on the First Time User? Click Here link in the Sign In box. You will see the New Member Sign Up page. 3. Enter your [employer]end Access Code exactly as it appears below. You will not need to use this code after youve completed the sign-up process. If you do not sign up before the expiration date, you must request a new code. · [employer] Access Code: [email or web address] Expires: 9/5/2018  9:01 PM 
 
4. Enter the last four digits of your Social Security Number (xxxx) and Date of Birth (mm/dd/yyyy) as indicated and click Submit. You will be taken to the next sign-up page. 5. Create a MatchLend ID. This will be your MatchLend login ID and cannot be changed, so think of one that is secure and easy to remember. 6. Create a [employer]Lend password. You can change your password at any time. 7. Enter your Password Reset Question and Answer. This can be used at a later time if you forget your password. 8. Enter your e-mail address. You will receive e-mail notification when new information is available in [address] 9. Click Sign Up. You can now view and download portions of your medical record. 10. Click the Download Summary menu link to download a portable copy of your medical information. If you have questions, please visit the Frequently Asked Questions section of the MatchLend website. Remember, [employer] is NOT to be used for urgent needs. For medical emergencies, dial 911. Now available from your iPhone and Android! Introducing Loki Nath As a [employer] patient, I wanted to make you aware of our electronic visit tool called Loki Nath. [employer] 24/7 allows you to connect within minutes with a medical provider 24 hours a day, seven days a week via a mobile device or tablet or logging into a secure website from your computer. You can access [employer] from anywhere in the [place]. A virtual visit might be right for you when you have a simple condition and feel like you just dont want to get out of bed, or cant get away from work for an appointment, when your regular [facility] provider is not available (evenings, weekends or holidays), or when youre out of town and need minor care. Electronic visits cost only $49 and if the [employer] 24/7 provider determines a prescription is needed to treat your condition, one can be electronically transmitted to a nearby pharmacy*. Please take a moment to enroll today if you have not already done so. The enrollment process is free and takes just a few minutes. To enroll, please download the Tor[facility] Old 24/7 todd to your tablet or phone, or visit [email or web address] to enroll on your computer. And, as an [address] [place] [address] patient with a [employer] account, the results of your visits will be scanned into your electronic medical record and your primary care provider will be able to view the scanned results. We urge you to continue to see your regular Torrie Old provider for your ongoing medical care. And while your primary care provider may not be the one available when you seek a Loki loanDepotoscar virtual visit, the peace of mind you get from getting a real diagnosis real time can be priceless. For more information on [employer], view our Frequently Asked Questions (FAQs) at [email or web address]. Sincerely, 
 
Nolan Caldwell MD 
Chief Medical Officer [employer] *:  certain medications cannot be prescribed via Loki Nath Providers Seen During Your Hospitalization Provider Specialty Primary office phone Darshana Tavarez MD Psychiatry [phone] Aditi Dao MD Psychiatry [phone] Your Primary Care Physician (PCP) Primary Care Physician Office Phone Office Fax Minal Damon [phone] You are allergic to the following Allergen Reactions Aspirin Other (comments) Bleeding ulcers Aspirin (Bulk) Nausea Only Ibuprofen (Bulk) Nausea Only Recent Documentation Height Weight Breastfeeding? BMI OB Status Smoking Status 1.549 m 92.6 kg No 38.58 kg/m2 Postmenopausal Current Every Day Smoker Emergency Contacts Name Discharge Info Relation Home Work Mobile HyunDeven N/A  AT THIS TIME [6] Other Relative [6] [phone] Patient Belongings The following personal items are in your possession at time of discharge: 
  Dental Appliances: None  Visual Aid: None      Home Medications: None   Jewelry: None  Clothing: Dress, Socks, Undergarments    Other Valuables: None  Personal Items Sent to Safe: none Please provide this summary of care documentation to your next provider. Signatures-by signing, you are acknowledging that this After Visit Summary has been reviewed with you and you have received a copy. Patient Signature:  ____________________________________________________________ Date:  ____________________________________________________________  
  
[place] Provider Signature:  ____________________________________________________________ Date:  ____________________________________________________________

## 2018-06-12 NOTE — BH NOTES
Patient admitted voluntarily to General Inpatient Psychiatry, under the services of . Patient currently denies suicidal ideation. Patient currently denies homicidal ideation. Patient verbally contracts for safety. Patient denies psychotic symptoms. Pt denies  ETOH use. Pt denies drug use.

## 2018-06-12 NOTE — PROGRESS NOTES
Patient was visited by AFUA. Volunteer followed up with patient and/or family and reported no needs to this . Chaplains will continue to follow and will provide pastoral care as needed or requested. [address], M.Div.   Board Certified   [phone] - Office

## 2018-06-12 NOTE — DISCHARGE SUMMARY
[address] SUMMARY    Juan Coe  MR#: [id number]  : 1970  ACCOUNT #: [de-identified]   ADMIT DATE: 2018  DISCHARGE DATE:     DIAGNOSES AT DISCHARGE:  Medication overdose, metabolic encephalopathy, deep venous thrombosis in the right lower extremity, left hip pain, osteoarthritis of the left hip rhabdomyolysis, elevated liver function tests, normochromic anemia. CONSULTANTS:  Zay Wray MD, et. al., [facility]. HOSPITAL SUMMARY:  This is a 56-year-old lady with a history of bipolar disorder who came into the hospital with suspected overdose. Apparently, they had found Zyprexa and Ativan beside her. She was found unresponsive at her boyfriend's home. She was brought to the emergency room. She admitted to taking \"a few Ativan,\" but she was in acute rhabdomyolysis. CT head showed no intracranial hemorrhage. EKG showed no evidence for prolonged QT. She was admitted to ICU, started on empiric antibiotics for possible aspiration pneumonia. She is a smoker chronically. She had consultation with critical care pulmonology and psychiatry during this stay. It was also noted that there was an empty bottle of olanzapine that was filled on   with a quantity of 60 and another bottle of olanzapine 15 mg filled on  that was empty, that had been 60 also. Nonetheless, she has had treatment for rhabdomyolysis with IV fluids here. The antibiotics have been stopped. Her cultures are negative. There is no evidence for infection. Metabolic encephalopathy has resolved. She has been seen by psychiatry and been under a sitter evaluation here. Psychiatry recommends inpatient evaluation for bipolar disorder and posttraumatic stress disorder with anxiety, and she is willing to do that. She has been accepted to an inpatient facility this afternoon. Today, she is awake and alert, in no acute distress. She is feeling better.   Yesterday, the Chloe catheter was discontinued and she has been able to urinate without issue. PHYSICAL EXAMINATION:  VITAL SIGNS:  Temperature is 98.4, pulse 95, blood pressure 106/66, respiratory rate 18, SaO2 98% on room air. GENERAL:  Mentation is appropriate. She is oriented x3. LUNGS:  Clear bilaterally. No rales, rhonchi or wheezes. CARDIAC:  Regular rate and rhythm. No murmur, rub or gallop. ABDOMEN:  Soft, nontender, no distention. EXTREMITIES:  Lower extremities with 2+ edema, right lower extremity. Trace edema, left lower extremity. LABORATORY:  Her labs show a normal white count of 7.3, H and H is 10.3 and 32.4, platelets are 379. Metabolic profile is unremarkable. Creatinine is 0.53. Her CPK is down from over [phone],000-3429. She was on heparin. She has been changed to Eliquis. Her urine drug screen was positive for methadone; alcohol level was less than 3. IMAGING:  She had a CT scan of her head again on 06/07 that showed no acute intracranial abnormalities. She had a left hip x-ray that showed mild left hip joint osteoarthritis with no evidence for fracture or malalignment. She continues to complain of left hip pain, but she is able to mobilize on it. She has been getting to the bathroom on her own at this point in time. She has been seen by hematology. They recommended the Eliquis transition. She has been seen by psych who recommends inpatient psych evaluation now considering the overdose and history of bipolar disorder, and she has been stable clinically at this point. We stopped IV fluids today. She is eating and drinking. DISCHARGE MEDICATIONS:  We plan to discharge her on Eliquis 5 mg twice daily, fish oil 0423 mg daily, folic acid 1 mg daily, Lasix 20 mg daily, Nicoderm patch 14 mg daily, vitamin C 500 mg daily and vitamin D 50,000 International Units  weekly. CODE STATUS:  She is a full code status. ALLERGIES:  ASPIRIN and IBUPROFEN.       CONDITION AT DISCHARGE:  She is improved in clinical condition. DISCHARGE INSTRUCTIONS:  I would continue PT with her for her left hip. Continue the Eliquis for the DVT and further psychiatric medication recommendations per inpatient psych. DISPOSITION:  She is going to [facility] under the care of Dr. Nitin Mendez. Thirty-five minutes discharge time.       MD JOHN Enriquez/NELLA  D: 06/12/2018 14:34     T: 06/12/2018 14:58  JOB #: [id number]

## 2018-06-12 NOTE — ROUTINE PROCESS
Report called to Telma Zapien RN at [facility] in [place] with understanding noted. Patient stable.
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
## 2018-06-12 NOTE — DISCHARGE SUMMARY
[address] SUMMARY    Carlyle Trammell  MR#: [id number]  : 1970  ACCOUNT #: [de-identified]   ADMIT DATE: 2018  DISCHARGE DATE:     MEDICATIONS ON DISCHARGE:  Eliquis 5 mg twice daily, vitamin C 500 mg daily, vitamin D 50,000 units weekly, folic acid 1 mg daily, Lasix 20 mg daily, fish oil 1000 mg cap daily, Nicoderm 14 mg per 24 hour patch daily.     DIAGNOSIS AT DISCHARGE:  Medication overdose    DICTATION ENDS Bonnetta Moritz, MD RI/CASSIUS  D: 2018 14:29     T: 2018 14:59  JOB #: [id number]

## 2018-06-13 PROBLEM — F31.9 BIPOLAR AFFECTIVE (HCC): Status: ACTIVE | Noted: 2018-06-13

## 2018-06-13 PROBLEM — F31.9 BIPOLAR DISORDER (HCC): Status: ACTIVE | Noted: 2018-06-13

## 2018-06-13 PROCEDURE — 97161 PT EVAL LOW COMPLEX 20 MIN: CPT

## 2018-06-13 PROCEDURE — 74011250637 HC RX REV CODE- 250/637: Performed by: PSYCHIATRY & NEUROLOGY

## 2018-06-13 PROCEDURE — 65220000003 HC RM SEMIPRIVATE PSYCH

## 2018-06-13 PROCEDURE — 74011250637 HC RX REV CODE- 250/637: Performed by: FAMILY MEDICINE

## 2018-06-13 RX ORDER — LANOLIN ALCOHOL/MO/W.PET/CERES
100 CREAM (GRAM) TOPICAL DAILY
Status: DISCONTINUED | OUTPATIENT
Start: 2018-06-14 | End: 2018-06-14 | Stop reason: HOSPADM

## 2018-06-13 RX ORDER — LORAZEPAM 2 MG/1
TABLET ORAL
COMMUNITY

## 2018-06-13 RX ORDER — ACETAMINOPHEN 325 MG/1
650 TABLET ORAL
Status: DISCONTINUED | OUTPATIENT
Start: 2018-06-13 | End: 2018-06-14 | Stop reason: HOSPADM

## 2018-06-13 RX ORDER — GABAPENTIN 400 MG/1
CAPSULE ORAL
COMMUNITY
End: 2018-06-14

## 2018-06-13 RX ORDER — LORAZEPAM 2 MG/1
4 TABLET ORAL
Status: DISCONTINUED | OUTPATIENT
Start: 2018-06-13 | End: 2018-06-14 | Stop reason: HOSPADM

## 2018-06-13 RX ORDER — FOLIC ACID 1 MG/1
1 TABLET ORAL DAILY
Status: DISCONTINUED | OUTPATIENT
Start: 2018-06-14 | End: 2018-06-14 | Stop reason: HOSPADM

## 2018-06-13 RX ORDER — PANTOPRAZOLE SODIUM 40 MG/1
40 TABLET, DELAYED RELEASE ORAL
Status: DISCONTINUED | OUTPATIENT
Start: 2018-06-13 | End: 2018-06-14 | Stop reason: HOSPADM

## 2018-06-13 RX ORDER — OLANZAPINE 15 MG/1
15 TABLET ORAL 2 TIMES DAILY
COMMUNITY
End: 2018-06-14

## 2018-06-13 RX ORDER — LORAZEPAM 2 MG/1
2 TABLET ORAL
Status: DISCONTINUED | OUTPATIENT
Start: 2018-06-13 | End: 2018-06-14 | Stop reason: HOSPADM

## 2018-06-13 RX ADMIN — FOLIC ACID 1 MG: 1 TABLET ORAL at 10:02

## 2018-06-13 RX ADMIN — APIXABAN 5 MG: 5 TABLET, FILM COATED ORAL at 10:02

## 2018-06-13 RX ADMIN — ZOLPIDEM TARTRATE 5 MG: 5 TABLET ORAL at 21:52

## 2018-06-13 RX ADMIN — APIXABAN 5 MG: 5 TABLET, FILM COATED ORAL at 17:47

## 2018-06-13 NOTE — PROGRESS NOTES
Pt was briefly seen by me to follow up on Lt hip pain that was mentioned in the consult/H&P note of Dr Johnnie Finn. I actually saw pt walking along the corridor in the psych unit. She states she just had generalized pain in the legs and not in her hip and this was getting better. She denies any recent fall. A/P:   Pt is essentially recovering from rhabdomyolysis which was associated with mild elevation in LFTs. CPK and LFTs were already getting better. I will not border to order X-ray of her hip since she has no pain at this time. Will sign off. May consult us as needed.
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
## 2018-06-13 NOTE — BH NOTES
GROUP THERAPY PROGRESS NOTE    Sergey Frank is participating in Leisure-[employer] Group. Group time: 15 minutes    Personal goal for participation:      Goal orientation: relaxation    Group therapy participation: active    Therapeutic interventions reviewed and discussed: Review of unit guidelines and socializing appropriately with peers.     Impression of participation: The patient focused mainly on what meds she can have while here
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
## 2018-06-13 NOTE — BH NOTES
GROUP THERAPY PROGRESS NOTE    Impression of participation: GROUP THERAPY PROGRESS NOTE    Hermelindo Drummond is participating in [place]. Group time: 30 minutes    Personal goal for participation:daily progress    Goal orientation: community    Group therapy participation: disruptive    Therapeutic interventions reviewed and discussed:     Impression of participation: The patient attempted to contradict the ideas of the other patients.

## 2018-06-13 NOTE — PROGRESS NOTES
06/13/18 1229   Vital Signs   Temp 97.4 °F (36.3 °C)   Temp Source Oral   Pulse (Heart Rate) (!) 102   Resp Rate 16   O2 Sat (%) 99 %   Level of Consciousness Alert   /75   MAP (Calculated) 85   BP 1 Method Automatic   MEWS Score 2   MEWS  2. Pt less anxious at this time. will continue to monitor.

## 2018-06-13 NOTE — BH NOTES
GROUP THERAPY PROGRESS NOTE    Ayush Mazariegos is participating in Discharge Planning Group    Group time: 1 hour    Personal goal for participation: Readiness for discharge     Goal orientation: Introduction to [address] [place] [address] therapy participation: active    Therapeutic interventions reviewed and discussed:     Impression of participation: Pt was very active, at times funny and eagerly self disclosed some of her issues. Pt said she could easily have used WRAP thus she endorsed it.

## 2018-06-13 NOTE — BH NOTES
PRN Medication Documentation    Specific patient behavior that led to need for PRN medication: patient complaining of escalating level of anxiety and restlessness  Staff interventions attempted prior to PRN being given: redirection, reorientation  PRN medication given: 1 mg ativan PO  Patient response/effectiveness of PRN medication: will continue to assess

## 2018-06-13 NOTE — PROGRESS NOTES
06/13/18 0747   Vital Signs   Temp 98.1 °F (36.7 °C)   Temp Source Oral   Pulse (Heart Rate) (!) 106   Resp Rate 16   O2 Sat (%) 97 %   Level of Consciousness Alert   BP 95/66   MAP (Calculated) 76   BP 1 Method Automatic   MEWS Score 3   MEWS 3. Pt anxious at this time. Will increase monitoring of vital signs.
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
## 2018-06-13 NOTE — PROGRESS NOTES
Problem: Depressed Mood (Adult/Pediatric)  Goal: *STG: Participates in treatment plan  Outcome: Progressing Towards Goal  Patient denies SI. Med and meal compliant. Cooperative.

## 2018-06-13 NOTE — BH NOTES
PRN Medication Documentation    Specific patient behavior that led to need for PRN medication: pain complaining of leg pain 8/10  Staff interventions attempted prior to PRN being given: redirection  PRN medication given: acetaminophen 650 mg PO  Patient response/effectiveness of PRN medication: will continue to reassess

## 2018-06-13 NOTE — BH NOTES
2305 - Pt continues to refuse IV as ordered. States she will drink fluids as she told other nurse. \"

## 2018-06-13 NOTE — PROGRESS NOTES
Problem: Falls - Risk of  Goal: *Absence of Falls  Document Shonna Fall Risk and appropriate interventions in the flowsheet.   Outcome: Progressing Towards Goal  Fall Risk Interventions:            Medication Interventions: Teach patient to arise slowly

## 2018-06-13 NOTE — PROGRESS NOTES
[address] [facility] 60  Master Treatment Plan for Brian Frank    Date Treatment Plan Initiated: 6/13/18    Treatment Plan Modalities:  Type of Modality Amount  (x minutes) Frequency (x/week) Duration (x days) Name of Responsible Staff   [address] [facility] meetings to encourage peer interactions 15 7 1   MARICRUZ Pedroza   Group psychotherapy to assist in building coping skills and internal controls 60 7 1 Kal Escalante   Therapeutic activity groups to build coping skills 60 7 1 Kal Escalante   Psychoeducation in group setting to address:   Medication education   15 7 1 Tonny Mota RN   Coping skills         Relaxation techniques         Symptom management         Discharge planning   60 2 255 [facility]    60 2 [facility]   61 1 1 Volunteer of [facility]/AA/NA      [place] [address]   Physician medication management   13 7 1 Dr. Annice Halsted meeting/discharge planning   15 2 Fred Miranda 78                                          Problem: Depressed Mood (Adult/Pediatric) goal will be met by 6/17/18  Goal: *STG: Participates in treatment plan  Outcome: Progressing Towards Goal  Pt participated in treatment team. Visible on the unit for small period of time. Goal: *STG: Verbalizes anger, guilt, and other feelings in a constructive manor  Outcome: Progressing Towards Goal  Pt able to verbalize feelings in a constructive manor. Goal: *STG: Attends activities and groups  Outcome: Progressing Towards Goal  Encouraged pt to attend groups and express feelings and concerns. Goal: *STG: Remains safe in hospital  Outcome: Progressing Towards Goal  Remains safe on the unit and continued on Q 15 minute safety checks. Goal: *STG: Complies with medication therapy  Outcome: Progressing Towards Goal  Taking medications as scheduled during the shift.      Problem: Falls - Risk of goal will be met by 6/17/18  Goal: *Absence of Falls  Document Shonna Fall Risk and appropriate interventions in the flowsheet.   Outcome: Progressing Towards Goal  Fall Risk Interventions:        Medication Interventions: Teach patient to arise slowly
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
## 2018-06-13 NOTE — PROGRESS NOTES
Pt declines xray stating, \"I just got that done at the other place. \"  Pt refuses IV, stating, \"they told me at the other place, that I just need to drink plenty of fluids. \"

## 2018-06-13 NOTE — INTERDISCIPLINARY ROUNDS
Behavioral Health Interdisciplinary Rounds     Patient Name: Monika Esqueda  Age: 57 y.o. Room/Bed:  729/02  Primary Diagnosis: <principal problem not specified>   Admission Status: Voluntary     Readmission within 30 days: no  Power of  in place:   Patient requires a blocked bed: no          Reason for blocked bed:     VTE Prophylaxis: Yes - Eliquis    Mobility needs/Fall risk: yes    Nutritional Plan: no  Consults:          Labs/Testing due today?: no    Sleep hours: 7.25       Participation in Care/Groups:  yes  Medication Compliant?: Yes  PRNS (last 24 hours):  Antianxiety and Pain    Restraints (last 24 hours):  no     CIWA (range last 24 hours): CIWA-Ar Total: 0   COWS (range last 24 hours): COWS Total: 1    Alcohol screening (AUDIT) completed -   AUDIT Score: 0     If applicable, date SBIRT discussed in treatment team AND documented:     Tobacco - patient is a smoker: Have You Used Tobacco in the Past 30 Days: Yes  Illegal Drugs use: Have You Used Any Illegal Substances Over the Past 12 Months: No    24 hour chart check complete: no - New Admission    Patient goal(s) for today: attend all groups  Treatment team focus/goals: psychosocial  Progress note: Pt transferred after medically stabilizing following an intentional overdose on Zyprexa and Ativan     LOS:  1  Expected LOS: TBD    Financial concerns/prescription coverage:  [employer] insurance  Date of last family contact:  None     Family requesting physician contact today:  No  Discharge plan: Return home  Guns in the home: No        Outpatient provider(s): Dr. Paula/Dr. Rona Cisneros ([address] [place] [address] in [address] [place] [address])    Participating treatment team members: Carlos Eduardo Gutierrez MSW; Dr. Freddy Jett MD; Eric Jordan, GUILLERMINA

## 2018-06-13 NOTE — PROGRESS NOTES
Problem: Depressed Mood (Adult/Pediatric)  Goal: *STG: Remains safe in hospital  Outcome: Progressing Towards Goal  Pt in bed asleep. NAD. Has remained safe this hospitalization. Q 15 minute checks for safety maintained.

## 2018-06-14 VITALS
OXYGEN SATURATION: 100 % | TEMPERATURE: 98.5 F | HEART RATE: 99 BPM | WEIGHT: 204.2 LBS | DIASTOLIC BLOOD PRESSURE: 65 MMHG | SYSTOLIC BLOOD PRESSURE: 127 MMHG | BODY MASS INDEX: 38.55 KG/M2 | RESPIRATION RATE: 18 BRPM | HEIGHT: 61 IN

## 2018-06-14 LAB
BACTERIA SPEC CULT: NORMAL
BACTERIA SPEC CULT: NORMAL
SERVICE CMNT-IMP: NORMAL
SERVICE CMNT-IMP: NORMAL

## 2018-06-14 PROCEDURE — 74011250637 HC RX REV CODE- 250/637: Performed by: FAMILY MEDICINE

## 2018-06-14 PROCEDURE — 74011250637 HC RX REV CODE- 250/637: Performed by: PSYCHIATRY & NEUROLOGY

## 2018-06-14 RX ADMIN — ACETAMINOPHEN 650 MG: 325 TABLET ORAL at 01:37

## 2018-06-14 RX ADMIN — APIXABAN 5 MG: 5 TABLET, FILM COATED ORAL at 17:41

## 2018-06-14 RX ADMIN — Medication 100 MG: at 09:15

## 2018-06-14 RX ADMIN — PANTOPRAZOLE SODIUM 40 MG: 40 TABLET, DELAYED RELEASE ORAL at 06:37

## 2018-06-14 RX ADMIN — FOLIC ACID 1 MG: 1 TABLET ORAL at 09:15

## 2018-06-14 RX ADMIN — APIXABAN 5 MG: 5 TABLET, FILM COATED ORAL at 09:15

## 2018-06-14 NOTE — PROGRESS NOTES
Problem: Falls - Risk of  Goal: *Absence of Falls  Document Shonna Fall Risk and appropriate interventions in the flowsheet. Outcome: Progressing Towards Goal  Fall Risk Interventions:       Mentation Interventions: Adequate sleep, hydration, pain control    Medication Interventions: Teach patient to arise slowly       Resting in bed with eyes closed, no complaints, no distress noted. Safety measures in place, will continue to monitor.            PRN Medication Documentation    Specific patient behavior that led to need for PRN medication: Pain  Staff interventions attempted prior to PRN being given: education  PRN medication given: tylenol  Patient response/effectiveness of PRN medication: will continue to monitor
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
## 2018-06-14 NOTE — BH NOTES
GROUP THERAPY PROGRESS NOTE    Zara Horne participated in the General Unit's Process Group, with a focus on setting a direction or goal, identifying feelings and planning for the day. Group time: 75 minutes. Personal goal for participation: To increase the capacity to improve ones mood and structure. Goal orientation: The patient will be able to identify a direction or goal for themselves, identify their feelings, develop a plan for structuring their day, and continue working on a discharge planning. Group therapy participation: With prompting, this patient actively participated in the group, except for the time when she was called out to meet with her treatment team.      Therapeutic interventions reviewed and discussed: The group members were asked to introduce themselves to each other and to see if they could identify an emotion they are having and/or let the group know what they want to focus on for the day as they continue to make discharge plans. They were also asked to read a list of twenty-five suggestions for living and to pick one for themselves today. Impression of participation: The patient said she was \"feeling better than yesterday. \" She went on to announce that she is planning to be discharged later today and gave the group a big smile. She was alert, generally oriented, and pleased with her discharge plans. She said she knew she would be going back into a challenging social environment. She said she thought she could rely on her outpatient therapist and prescriber. She expressed no current SI/HI and displayed no overt psychotic symptoms. She, again, reiterated about how grateful she feels to be alive after going through her recent overdose. She shared that she was glad she had gotten on [facility] program because it has helped her make her appointments. Her affect of depression and anxiety were not as intense as it was on admission. Her mood reflected her affect.  She was focused on finalizing her discharge plans.

## 2018-06-14 NOTE — BH NOTES
PRN Medication Documentation    Specific patient behavior that led to need for PRN medication: Patient requested med to promote sleep.    Staff interventions attempted prior to PRN being given: watched TV, ate snacks, attended Reflections Group With peers  PRN medication given: 2152 Ambien    Patient response/effectiveness of PRN medication: will monitor

## 2018-06-14 NOTE — INTERDISCIPLINARY ROUNDS
Behavioral Health Interdisciplinary Rounds     Patient Name: Ayush Mazariegos  Age: 57 y.o. Room/Bed:  729/02  Primary Diagnosis: Bipolar disorder ([facility]   Admission Status: Voluntary     Readmission within 30 days: no  Power of  in place: no  Patient requires a blocked bed: no          Reason for blocked bed:     VTE Prophylaxis: Yes-Eliquis    Mobility needs/Fall risk: yes    Nutritional Plan: no  Consults:          Labs/Testing due today?: no    Sleep hours: 7       Participation in Care/Groups:  yes  Medication Compliant?: Yes  PRNS (last 24 hours): None    Restraints (last 24 hours):  no     CIWA (range last 24 hours): CIWA-Ar Total: 0   COWS (range last 24 hours): COWS Total: 1    Alcohol screening (AUDIT) completed -   AUDIT Score: 0     If applicable, date SBIRT discussed in treatment team AND documented:     Tobacco - patient is a smoker: Have You Used Tobacco in the Past 30 Days: Yes  Illegal Drugs use: Have You Used Any Illegal Substances Over the Past 12 Months: No    24 hour chart check complete: Yes    Patient goal(s) for today: Discharge  Treatment team focus/goals: Discharge  Progress note: Pt reports wanting to discharge and is stable.  Denies SI     LOS:  2  Expected LOS: 2    Financial concerns/prescription coverage:  [place]  Date of last family contact:      Family requesting physician contact today:  No  Discharge plan: Return home  Guns in the home: No       Outpatient provider(s): [address] [place] [address] in [place]    Participating treatment team members: FORD Danielle; Dr. August Conner MD; Everett Burton, GUILLERMINA; Frieda Wong MSW

## 2018-06-14 NOTE — BH NOTES
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 You have a 5:00pm appointment for individual therapy. [address] [place] [address], [address]  [phone]    Dr. Joaquina Szymanski On 8/21/2018 You have a 10:00am appointment with the psychiatrist. Please call the office if you need a refill of medications in July. [address] [facility] [address]  [phone]    Delmi Chawla MD  follow up within 7 to 10 days to review medication changes and addition to elquis to medication regmement [address]  [phone]            Advanced Directive:   Does the patient have an appointed surrogate decision maker? No  Does the patient have a Medical Advance Directive? No  Does the patient have a Psychiatric Advance Directive?  No  If the patient does not have a surrogate or Medical Advance Directive AND Psychiatric Advance Directive, the patient was offered information on these advance directives Yes and Patient declined to complete    Patient Instructions: Please continue all medications until otherwise directed by physician. Tobacco Cessation Discharge Plan:   Is the patient a smoker and needs referral for smoking cessation? Yes  Patient referred to the following for smoking cessation with an appointment? Refused     Patient was offered medication to assist with smoking cessation at discharge? Refused  Was education for smoking cessation added to the discharge instructions? Yes    Alcohol/Substance Abuse Discharge Plan:   Does the patient have a history of substance/alcohol abuse and requires a referral for treatment? No  Patient referred to the following for substance/alcohol abuse treatment with an appointment? Refused  Patient was offered medication to assist with alcohol cessation at discharge? Refused  Was education for substance/alcohol abuse added to discharge instructions? Yes    Patient discharged to Home; discussed with patient/caregiver and provided to the patient/caregiver either in hard copy or electronically.

## 2018-06-14 NOTE — PROGRESS NOTES
Problem: Depressed Mood (Adult/Pediatric)  Goal: *STG: Participates in treatment plan  Outcome: Progressing Towards Goal  Out on unit for meal and phone. Affect sad, mood euthymic. Denies SI, no self harming behaviors. Thoughts organized, logical and goal directed. Daily goal is to d/c home. Staff focus is on d/c planning    1143: paged hospital team #1 Dr. Sarahi Fisher awaiting call back    [phone]: return call states he will look at chart. 1310: kacy called into pharmacy of choice.  PCP appointment called left message requesting appointment awaiting call back

## 2018-06-14 NOTE — DISCHARGE INSTRUCTIONS
DISCHARGE SUMMARY    NAME:Tianna Purvis  : 1970  MRN: [id number]    The patient Ross Hallman exhibits the ability to control behavior in a less restrictive environment. Patient's level of functioning is improving. No assaultive/destructive behavior has been observed for the past 24 hours. No suicidal/homicidal threat or behavior has been observed for the past 24 hours. There is no evidence of serious medication side effects. Patient has not been in physical or protective restraints for at least the past 24 hours. If weapons involved, how are they secured? No weapons involved. Is patient aware of and in agreement with discharge plan? Yes    Arrangements for medication:  Prescriptions given to patient. Referral for substance abuse treatment? Patient is not using substances/Not applicable. Referral for smoking cessation needed? Yes, refused. Copy of discharge instructions to provider?:  Dr. Lobito Cisneros ([phone])    Arrangements for transportation home:  Family to . Keep all follow up appointments as scheduled, continue to take prescribed medications per physician instructions. Mental health crisis number:  366 or your local mental health crisis line number at [phone]. DISCHARGE SUMMARY from Nurse    PATIENT INSTRUCTIONS:    What to do at Home:  Recommended activity: Activity as tolerated,     If you experience any of the following symptoms thoughts of harming self, feeling overwhelmed with anxiety, hopelessness or loneliness, please follow up with your assigned providers and local crisis number. *  Please give a list of your current medications to your Primary Care Provider. *  Please update this list whenever your medications are discontinued, doses are      changed, or new medications (including over-the-counter products) are added. *  Please carry medication information at all times in case of emergency situations.     These are general instructions for a healthy lifestyle:    No smoking/ No tobacco products/ Avoid exposure to second hand smoke  Surgeon General's Warning:  Quitting smoking now greatly reduces serious risk to your health. Obesity, smoking, and sedentary lifestyle greatly increases your risk for illness    A healthy diet, regular physical exercise & weight monitoring are important for maintaining a healthy lifestyle    You may be retaining fluid if you have a history of heart failure or if you experience any of the following symptoms:  Weight gain of 3 pounds or more overnight or 5 pounds in a week, increased swelling in our hands or feet or shortness of breath while lying flat in bed. Please call your doctor as soon as you notice any of these symptoms; do not wait until your next office visit. Recognize signs and symptoms of STROKE:    F-face looks uneven    A-arms unable to move or move unevenly    S-speech slurred or non-existent    T-time-call 911 as soon as signs and symptoms begin-DO NOT go       Back to bed or wait to see if you get better-TIME IS BRAIN. Warning Signs of HEART ATTACK     Call 911 if you have these symptoms:   Chest discomfort. Most heart attacks involve discomfort in the center of the chest that lasts more than a few minutes, or that goes away and comes back. It can feel like uncomfortable pressure, squeezing, fullness, or pain.  Discomfort in other areas of the upper body. Symptoms can include pain or discomfort in one or both arms, the back, neck, jaw, or stomach.  Shortness of breath with or without chest discomfort.  Other signs may include breaking out in a cold sweat, nausea, or lightheadedness. Don't wait more than five minutes to call 911 - MINUTES MATTER! Fast action can save your life. Calling 911 is almost always the fastest way to get lifesaving treatment.  Emergency Medical Services staff can begin treatment when they arrive -- up to an hour sooner than if someone gets to the [facility] by car. The discharge information has been reviewed with the patient. The patient verbalized understanding. Discharge medications reviewed with the patient and appropriate educational materials and side effects teaching were provided.   ___________________________________________________________________________________________________________________________________

## 2018-06-15 NOTE — PROGRESS NOTES
Spoke with David Stephens of [facility] ([phone]). Fax #[phone]. She was provided medical records contact number.